# Patient Record
Sex: FEMALE | Race: BLACK OR AFRICAN AMERICAN | NOT HISPANIC OR LATINO | Employment: STUDENT | URBAN - METROPOLITAN AREA
[De-identification: names, ages, dates, MRNs, and addresses within clinical notes are randomized per-mention and may not be internally consistent; named-entity substitution may affect disease eponyms.]

---
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
## 2021-05-12 ENCOUNTER — PATIENT MESSAGE (OUTPATIENT)
Dept: RESEARCH | Facility: HOSPITAL | Age: 21
End: 2021-05-12

## 2023-07-05 ENCOUNTER — PATIENT MESSAGE (OUTPATIENT)
Dept: RESEARCH | Facility: HOSPITAL | Age: 23
End: 2023-07-05

## 2024-02-16 ENCOUNTER — TELEPHONE (OUTPATIENT)
Dept: SURGERY | Facility: CLINIC | Age: 24
End: 2024-02-16
Payer: COMMERCIAL

## 2024-02-22 PROBLEM — Z91.89 AT HIGH RISK FOR BREAST CANCER: Status: ACTIVE | Noted: 2024-02-22

## 2024-02-22 PROBLEM — Z80.3 FAMILY HISTORY OF BREAST CANCER: Status: ACTIVE | Noted: 2024-02-22

## 2024-02-26 NOTE — PROGRESS NOTES
Breast Surgical Oncology  [place]  [facility]        PCP:  Philippe Espinoza Jr., MD  Date of Service: 2024    CHIEF COMPLAINT:   At high-risk for breast cancer    DIAGNOSIS:   Jaclyn Turpin is a 23 y.o. female who is kindly referred by Dr. Vidhya Stoll for evaluation of increased risk of breast cancer based on elevated score by a risk assessment model and family history of breast or ovarian cancer.     Her detailed family history of breast or ovarian cancer is as follows: paternal grandmother with ovarian cancer at age 60,  at age 62; mother with breast cancer at age 49. [employer] genetic testing is negative for a germline mutation.    Today, she denies breast concerns such as pain, masses, skin changes, nipple discharge, nipple retraction or lumps under the arm.     Her breast cancer risk factor profile is as follows: Menarche at 13, Menopause at N/A.  She is . Age at first live birth was N/A. HRT: no    Other breast cancer risk factors include mom with breast CA and family hx of ovarian CA.     FAMILY HISTORY:     Family History   Problem Relation Age of Onset    Diabetes Mother     Hypertension Mother         PAST MEDICAL HISTORY:     Past Medical History:   Diagnosis Date    Alopecia     Asthma     Psoriasis        SURGICAL HISTORY:     Past Surgical History:   Procedure Laterality Date    laparoscopic bilateral salpingectomy  2023    WISDOM TOOTH EXTRACTION         SOCIAL HISTORY:     Social History     Tobacco Use    Smoking status: Never    Smokeless tobacco: Never   Substance Use Topics    Alcohol use: Not Currently    Drug use: Never        MEDICATIONS/ALLERGIES:     Current Outpatient Medications   Medication Instructions    albuterol (PROVENTIL) 2.5 mg /3 mL (0.083 %) nebulizer solution No dose, route, or frequency recorded.    albuterol (PROVENTIL/VENTOLIN HFA) 90 mcg/actuation inhaler INHALE 2 PUFFS INTO THE LUNGS EVERY 6 HOURS AS NEEDED FOR WHEEZING FOR UP TO 30  DAYS.    augmented betamethasone (DIPROLENE) 0.05 % lotion APPLY WEEKLY TO SCALP    cetirizine (ZYRTEC) 10 mg, Oral    clindamycin phosphate foam     clindamycin-benzoyl peroxide gel APPLY SMALL AMOUNT TO ACNE AREAS MONDAY WEDNESDAY AND FRIDAY NIGHTS OR AS TOLERATED    desonide (DESOWEN) 0.05 % cream APPLY TWICE DAILY TO RASHES WHEN FLARING    EPINEPHrine (EPIPEN) 0.3 mg, Injection, Daily PRN    fluconazole (DIFLUCAN) 150 MG Tab Take 1 tablet by mouth on Day 1, Day 4 and Day 7    hydrocortisone 2.5 % cream APPLY TWICE DAILY TO FACIAL RASH FOR 3 DAYS THEN ONLY USE WHEN FLARING    ibuprofen (ADVIL,MOTRIN) 200 mg, Oral    ibuprofen (ADVIL,MOTRIN) 600 mg, Oral, Every 6 hours PRN    methocarbamoL (ROBAXIN) 500 mg, Oral    mupirocin (BACTROBAN) 2 % ointment 2 times daily PRN, Apply to affected area    oxyCODONE (ROXICODONE) 5 mg, Oral, Every 4 hours PRN    tretinoin (RETIN-A) 0.025 % cream PEASIZE AMOUNT TO ACNE AREAS MONDAY WEDNESDAY AND FRIDAY NIGHTS AS TOLERATED     Review of patient's allergies indicates:   Allergen Reactions    Dairycare [lactobacillus acidoph-lactase]     Doxycycline     Gluten protein     Minocycline        REVIEW OF SYSTEMS:   I have reviewed 12 systems, including 2 points per system. Pertinent positives reported are: cold/heat intolerance, frequent urination, easy bruising/bleeding, diarrhea/constipation    PHYSICAL EXAM:   General: The patient appears well and is in no acute distress.     Kathleen Tamez MA was present as a chaperone for the examination.   BREAST EXAM  No Asymmetry  Right:  - Mass: No  - Skin change: No  - Nipple Discharge: No  - Nipple retraction: No  - Axillary LAD: No  Left:   - Mass: No  - Skin change: No  - Nipple Discharge: No  - Nipple retraction: No  - Axillary LAD: No    IMAGING:   Results for orders placed in visit on 01/30/24    US Breast Left Limited    Narrative  Result:  US Breast Left Limited    History:  Patient is 23 y.o. and is seen for breast pain.      Films  Compared:  Prior images (if available) were compared.    Findings:  There is no evidence of suspicious masses.    Limited Breast ultrasound was performed. Ultrasound evaluation demonstrates no suspicious abnormality.    Impression  No sonographic evidence of malignancy.    BI-RADS Category 1: Negative Finding    Recommendation:  Annual mammogram is recommended beginning at age 40.      PATHOLOGY:   none    ASSESSMENT:     1. At high risk for breast cancer    2. Family history of breast cancer          PLAN:   Jaclyn Turpin is a 23 y.o. female who presents for evaluation in the high risk program.  She was identified for the program by her OBGYN due to having a elevated score by a risk assessment model and family history of breast or ovarian cancer.  Her lifetime risk for the development of breast cancer by the Tyrer Cuzick v8 model is 31.8%.  Therefore, she meets criteria to be followed and screened as a high risk patient.      We reviewed the NCCN guidelines for high risk women to be the following:   Twice annual clinical breast exam  Screening mammogram beginning 10 years earlier than the youngest affected family member  Consideration of supplemental annual imaging alternating with her mammogram on the 6 month interval. We discussed that the guidelines currently include breast MRI as the supplemental imaging option.   Adopting risk-reduction strategies and   Consideration of chemoprevention.      Her individualized plan is the following:  She will see me each July for a clinical breast exam and see her OBGYN each January for her second annual clinical breast exam.    She will have her mammogram starting at age 39  She would like to proceed with MRI starting at age 30  Regarding risk reduction, she is recommended to maintain a healthy weight (BMI 18-25), regular aerobic exercise (at least 150 minutes/week), balanced healthy diet (high in vegetables, fruits, and whole grains) and avoidance of red meats, processed foods, &  refined sugars. , and limit alcohol consumption .   We discussed the findings of the NSABP Prevention One trial and the potential side effects of tamoxifen. She is not interested in chemoprevention at this time. .    The patient has had genetic testing and based on the results does not warrant further genetic counseling.     Jaclyn Turpin has a normal breast exam today. We discussed the possible signs and symptoms of breast cancer as lump, masses, new asymmetries, skin changes and nipple changes. She is encouraged to contact me if any new breast concerns arise.  She has been provided a handout that details today's discussion and her plan.     I spent a total of 30 minutes on this visit. This includes face to face time and non-face to face time preparing to see the patient (eg, review of tests), obtaining and/or reviewing separately obtained history, documenting clinical information in the electronic or other health record, independently interpreting results and communicating results to the patient/family/caregiver, or care coordinator.      Pauline Taylor MD

## 2024-03-01 ENCOUNTER — OFFICE VISIT (OUTPATIENT)
Dept: SURGERY | Facility: CLINIC | Age: 24
End: 2024-03-01
Payer: COMMERCIAL

## 2024-03-01 DIAGNOSIS — Z91.89 AT HIGH RISK FOR BREAST CANCER: Primary | ICD-10-CM

## 2024-03-01 DIAGNOSIS — Z80.3 FAMILY HISTORY OF BREAST CANCER: ICD-10-CM

## 2024-03-01 PROCEDURE — 99203 OFFICE O/P NEW LOW 30 MIN: CPT | Mod: S$GLB,,, | Performed by: SURGERY

## 2024-03-01 PROCEDURE — 99999 PR PBB SHADOW E&M-EST. PATIENT-LVL I: CPT | Mod: PBBFAC,,, | Performed by: SURGERY

## 2024-03-27 ENCOUNTER — OFFICE VISIT (OUTPATIENT)
Dept: UROLOGY | Facility: CLINIC | Age: 24
End: 2024-03-27
Payer: COMMERCIAL

## 2024-03-27 VITALS
WEIGHT: 181 LBS | RESPIRATION RATE: 16 BRPM | HEIGHT: 67 IN | SYSTOLIC BLOOD PRESSURE: 117 MMHG | DIASTOLIC BLOOD PRESSURE: 22 MMHG | HEART RATE: 99 BPM | BODY MASS INDEX: 28.41 KG/M2

## 2024-03-27 DIAGNOSIS — K59.00 CONSTIPATION, UNSPECIFIED CONSTIPATION TYPE: ICD-10-CM

## 2024-03-27 DIAGNOSIS — R35.0 URINARY FREQUENCY: Primary | ICD-10-CM

## 2024-03-27 DIAGNOSIS — R35.1 NOCTURIA: ICD-10-CM

## 2024-03-27 LAB
BILIRUB UR QL STRIP: NEGATIVE
GLUCOSE UR QL STRIP: NEGATIVE
KETONES UR QL STRIP: NEGATIVE
LEUKOCYTE ESTERASE UR QL STRIP: NEGATIVE
PH, POC UA: 7
POC BLOOD, URINE: NEGATIVE
POC NITRATES, URINE: NEGATIVE
POC RESIDUAL URINE VOLUME: 0 ML (ref 0–100)
PROT UR QL STRIP: NEGATIVE
SP GR UR STRIP: 1.02 (ref 1–1.03)
UROBILINOGEN UR STRIP-ACNC: 0.2 (ref 0.1–1.1)

## 2024-03-27 PROCEDURE — 3078F DIAST BP <80 MM HG: CPT | Mod: CPTII,S$GLB,, | Performed by: UROLOGY

## 2024-03-27 PROCEDURE — 51798 US URINE CAPACITY MEASURE: CPT | Mod: S$GLB,,, | Performed by: UROLOGY

## 2024-03-27 PROCEDURE — 99999 PR PBB SHADOW E&M-EST. PATIENT-LVL IV: CPT | Mod: PBBFAC,,, | Performed by: UROLOGY

## 2024-03-27 PROCEDURE — 81003 URINALYSIS AUTO W/O SCOPE: CPT | Mod: QW,S$GLB,, | Performed by: UROLOGY

## 2024-03-27 PROCEDURE — 1159F MED LIST DOCD IN RCRD: CPT | Mod: CPTII,S$GLB,, | Performed by: UROLOGY

## 2024-03-27 PROCEDURE — 99204 OFFICE O/P NEW MOD 45 MIN: CPT | Mod: S$GLB,,, | Performed by: UROLOGY

## 2024-03-27 PROCEDURE — 3074F SYST BP LT 130 MM HG: CPT | Mod: CPTII,S$GLB,, | Performed by: UROLOGY

## 2024-03-27 PROCEDURE — 3008F BODY MASS INDEX DOCD: CPT | Mod: CPTII,S$GLB,, | Performed by: UROLOGY

## 2024-03-27 NOTE — PROGRESS NOTES
Subjective:       Patient ID: Jaclyn Turpin is a 23 y.o. female.    Chief Complaint: Urinary Frequency      History of Present Illness:     Ms Turpin says that she underwent a tubal ligation towards the end of 2023 by Dr Stoll.  She says since this time she starting having problems with urinary frequency and nocturia X 1-2.  No significant urgency.  No urinary incontinence.  Occasional feelings of incomplete bladder emptying.  No gross hematuria.  No dysuria.  No history of recurrent UTIs.  She says she is also having problems with constipation and bloating.    Urinary Frequency   Associated symptoms include frequency. Pertinent negatives include no chills, hematuria, nausea, vomiting or rash.        Past Medical History:   Diagnosis Date    Alopecia     Asthma     Psoriasis      Family History   Problem Relation Age of Onset    Breast cancer Mother     Cancer Mother     Diabetes Mother     Hypertension Mother      Social History     Socioeconomic History    Marital status: Single   Tobacco Use    Smoking status: Never    Smokeless tobacco: Never   Substance and Sexual Activity    Alcohol use: Not Currently    Drug use: Never    Sexual activity: Yes   Social History Narrative    ** Merged History Encounter **          Outpatient Encounter Medications as of 3/27/2024   Medication Sig Dispense Refill    albuterol (PROVENTIL) 2.5 mg /3 mL (0.083 %) nebulizer solution       albuterol (PROVENTIL/VENTOLIN HFA) 90 mcg/actuation inhaler INHALE 2 PUFFS INTO THE LUNGS EVERY 6 HOURS AS NEEDED FOR WHEEZING FOR UP TO 30 DAYS.      augmented betamethasone (DIPROLENE) 0.05 % lotion APPLY WEEKLY TO SCALP      cetirizine (ZYRTEC) 10 MG tablet Take 10 mg by mouth.      clindamycin phosphate foam       clindamycin-benzoyl peroxide gel APPLY SMALL AMOUNT TO ACNE AREAS MONDAY WEDNESDAY AND FRIDAY NIGHTS OR AS TOLERATED      desonide (DESOWEN) 0.05 % cream APPLY TWICE DAILY TO RASHES WHEN FLARING      EPINEPHrine (EPIPEN) 0.3 mg/0.3 mL AtIn  "Inject 0.3 mg as directed daily as needed.      hydrocortisone 2.5 % cream APPLY TWICE DAILY TO FACIAL RASH FOR 3 DAYS THEN ONLY USE WHEN FLARING      methocarbamol (ROBAXIN) 500 MG Tab Take 500 mg by mouth.      mupirocin (BACTROBAN) 2 % ointment 2 (two) times daily as needed. Apply to affected area      tretinoin (RETIN-A) 0.025 % cream PEASIZE AMOUNT TO ACNE AREAS MONDAY WEDNESDAY AND FRIDAY NIGHTS AS TOLERATED      fluconazole (DIFLUCAN) 150 MG Tab Take 1 tablet by mouth on Day 1, Day 4 and Day 7 (Patient not taking: Reported on 3/11/2024) 3 tablet 0    ibuprofen (ADVIL,MOTRIN) 200 MG tablet Take 200 mg by mouth.      ibuprofen (ADVIL,MOTRIN) 600 MG tablet Take 1 tablet (600 mg total) by mouth every 6 (six) hours as needed for Pain. (Patient not taking: Reported on 1/5/2024) 30 tablet 1    oxyCODONE (ROXICODONE) 5 MG immediate release tablet Take 1 tablet (5 mg total) by mouth every 4 (four) hours as needed for Pain (pan). (Patient not taking: Reported on 1/5/2024) 12 tablet 0     No facility-administered encounter medications on file as of 3/27/2024.        Review of Systems   Constitutional:  Negative for chills and fever.   Respiratory:  Negative for shortness of breath.    Cardiovascular:  Negative for chest pain.   Gastrointestinal:  Negative for nausea and vomiting.   Genitourinary:  Positive for frequency. Negative for difficulty urinating and hematuria.   Musculoskeletal:  Negative for back pain.   Skin:  Negative for rash.   Neurological:  Negative for dizziness.   Psychiatric/Behavioral:  Negative for agitation.        Objective:     BP (!) 117/22 (BP Location: Right arm, Patient Position: Sitting)   Pulse 99   Resp 16   Ht 5' 7" (1.702 m)   Wt 82.1 kg (181 lb)   LMP 02/21/2023   BMI 28.35 kg/m²     Physical Exam  Constitutional:       General: She is not in acute distress.  Pulmonary:      Effort: Pulmonary effort is normal.   Abdominal:      General: There is no distension.      Palpations: " Abdomen is soft.   Neurological:      Mental Status: She is alert and oriented to person, place, and time.         Office Visit on 03/27/2024   Component Date Value Ref Range Status    POC Residual Urine Volume 03/27/2024 0  0 - 100 mL Final    POC Blood, Urine 03/27/2024 Negative  Negative Final    POC Bilirubin, Urine 03/27/2024 Negative  Negative Final    POC Urobilinogen, Urine 03/27/2024 0.2  0.1 - 1.1 Final    POC Ketones, Urine 03/27/2024 Negative  Negative Final    POC Protein, Urine 03/27/2024 Negative  Negative Final    POC Nitrates, Urine 03/27/2024 Negative  Negative Final    POC Glucose, Urine 03/27/2024 Negative  Negative Final    pH, UA 03/27/2024 7.0   Final    POC Specific Gravity, Urine 03/27/2024 1.020  1.003 - 1.029 Final    POC Leukocytes, Urine 03/27/2024 Negative  Negative Final        Results for orders placed or performed in visit on 03/27/24 (from the past 8760 hour(s))   POCT Bladder Scan   Result Value    POC Residual Urine Volume 0        Assessment:       1. Urinary frequency    2. Nocturia    3. Constipation, unspecified constipation type      Plan:     Orders Placed This Encounter    POCT Bladder Scan    POCT Urinalysis, Dipstick, Automated, W/O Scope        Assessment:  - Urinary frequency and nocturia.  - Constipation.    Plan:  - I discussed the option of her starting on a bladder control medication such as myrbetriq or gemtesa.  She says that she wants to hold off on starting on a bladder control medication at this time.  - I discussed the option of her taking miralax daily for a few weeks to see if this will help with her constipation.  I told her that if her constipation does not improve with the daily miralax, then I would recommend she see a Gastroenterologist.  - I discussed dietary modifications with her today and I recommended she drink mostly water during the day.  - I recommended she limit her fluid intake at night to small amounts of water and to void just prior to  bedtime.    - RTC as needed.

## 2024-08-12 ENCOUNTER — TELEPHONE (OUTPATIENT)
Dept: INTERNAL MEDICINE | Facility: CLINIC | Age: 24
End: 2024-08-12
Payer: COMMERCIAL

## 2024-08-12 NOTE — TELEPHONE ENCOUNTER
----- Message from Brain Sifuentes LPN sent at 8/12/2024  3:24 PM CDT -----  Good afternoon,    Patient would like to see Dr. Salas to est care. Dr. Vidhya Stoll recommended her. Can someone please assist with getting her scheduled?    Thank you!

## 2024-09-17 ENCOUNTER — OFFICE VISIT (OUTPATIENT)
Dept: INTERNAL MEDICINE | Facility: CLINIC | Age: 24
End: 2024-09-17
Payer: COMMERCIAL

## 2024-09-17 ENCOUNTER — HOSPITAL ENCOUNTER (OUTPATIENT)
Dept: RADIOLOGY | Facility: HOSPITAL | Age: 24
Discharge: HOME | End: 2024-09-17
Attending: PHYSICIAN ASSISTANT
Payer: COMMERCIAL

## 2024-09-17 VITALS
TEMPERATURE: 99 F | WEIGHT: 182.56 LBS | OXYGEN SATURATION: 99 % | BODY MASS INDEX: 28.59 KG/M2 | RESPIRATION RATE: 20 BRPM | HEART RATE: 105 BPM | SYSTOLIC BLOOD PRESSURE: 124 MMHG | DIASTOLIC BLOOD PRESSURE: 84 MMHG

## 2024-09-17 DIAGNOSIS — K59.00 CONSTIPATION, UNSPECIFIED CONSTIPATION TYPE: ICD-10-CM

## 2024-09-17 DIAGNOSIS — E53.8 FOLATE DEFICIENCY: ICD-10-CM

## 2024-09-17 DIAGNOSIS — Z00.00 ANNUAL PHYSICAL EXAM: Primary | ICD-10-CM

## 2024-09-17 DIAGNOSIS — E53.8 VITAMIN B12 DEFICIENCY: ICD-10-CM

## 2024-09-17 DIAGNOSIS — D57.3 SICKLE CELL TRAIT: ICD-10-CM

## 2024-09-17 DIAGNOSIS — D50.8 IRON DEFICIENCY ANEMIA SECONDARY TO INADEQUATE DIETARY IRON INTAKE: ICD-10-CM

## 2024-09-17 PROBLEM — E55.9 VITAMIN D DEFICIENCY: Status: ACTIVE | Noted: 2024-09-17

## 2024-09-17 PROCEDURE — 3074F SYST BP LT 130 MM HG: CPT | Mod: CPTII,S$GLB,, | Performed by: PHYSICIAN ASSISTANT

## 2024-09-17 PROCEDURE — 3008F BODY MASS INDEX DOCD: CPT | Mod: CPTII,S$GLB,, | Performed by: PHYSICIAN ASSISTANT

## 2024-09-17 PROCEDURE — 74019 RADEX ABDOMEN 2 VIEWS: CPT | Mod: TC

## 2024-09-17 PROCEDURE — 99385 PREV VISIT NEW AGE 18-39: CPT | Mod: S$GLB,,, | Performed by: PHYSICIAN ASSISTANT

## 2024-09-17 PROCEDURE — 1159F MED LIST DOCD IN RCRD: CPT | Mod: CPTII,S$GLB,, | Performed by: PHYSICIAN ASSISTANT

## 2024-09-17 PROCEDURE — 99999 PR PBB SHADOW E&M-EST. PATIENT-LVL V: CPT | Mod: PBBFAC,,, | Performed by: PHYSICIAN ASSISTANT

## 2024-09-17 PROCEDURE — 3079F DIAST BP 80-89 MM HG: CPT | Mod: CPTII,S$GLB,, | Performed by: PHYSICIAN ASSISTANT

## 2024-09-17 PROCEDURE — 1160F RVW MEDS BY RX/DR IN RCRD: CPT | Mod: CPTII,S$GLB,, | Performed by: PHYSICIAN ASSISTANT

## 2024-09-17 PROCEDURE — 74019 RADEX ABDOMEN 2 VIEWS: CPT | Mod: 26,,, | Performed by: RADIOLOGY

## 2024-09-17 RX ORDER — FLUTICASONE PROPIONATE 50 MCG
1 SPRAY, SUSPENSION (ML) NASAL
COMMUNITY
Start: 2024-08-19

## 2024-09-17 RX ORDER — DOCUSATE SODIUM 100 MG/1
CAPSULE, LIQUID FILLED ORAL
COMMUNITY
End: 2024-09-17

## 2024-09-17 RX ORDER — ERGOCALCIFEROL 1.25 MG/1
1 CAPSULE ORAL
COMMUNITY
Start: 2022-06-23 | End: 2024-09-17

## 2024-09-17 RX ORDER — SULFAMETHOXAZOLE AND TRIMETHOPRIM 800; 160 MG/1; MG/1
1 TABLET ORAL 2 TIMES DAILY PRN
COMMUNITY
Start: 2024-08-03 | End: 2024-09-17

## 2024-09-17 RX ORDER — LEVOCETIRIZINE DIHYDROCHLORIDE 5 MG/1
5 TABLET, FILM COATED ORAL 2 TIMES DAILY
COMMUNITY

## 2024-09-17 RX ORDER — FOLIC ACID 0.8 MG
800 TABLET ORAL DAILY
COMMUNITY

## 2024-09-17 RX ORDER — METHOCARBAMOL 750 MG/1
750 TABLET, FILM COATED ORAL
COMMUNITY

## 2024-09-17 RX ORDER — PIMECROLIMUS 10 MG/G
CREAM TOPICAL
COMMUNITY
End: 2024-09-17

## 2024-09-17 RX ORDER — SYRING-NEEDL,DISP,INSUL,0.3 ML 29 G X1/2"
SYRINGE, EMPTY DISPOSABLE MISCELLANEOUS 2 TIMES DAILY PRN
COMMUNITY

## 2024-09-17 RX ORDER — CLASCOTERONE 1 G/100G
CREAM TOPICAL
COMMUNITY
Start: 2024-08-12 | End: 2024-09-17

## 2024-09-17 NOTE — PROGRESS NOTES
Subjective:      Patient ID: Jaclyn Turpin is a 24 y.o. female.    Chief Complaint: Establish Care (She is here to est care with a new PCP, states not happy with current PCP. )    Patient is new to clinic, being seen today to establish care at this location.     Intermittent low back pain x2mths, currently taking robaxin prn    She is pescatarian    Previous PCP Dr. Philippe Espinoza, last check up ~2mths ago     Review of Systems   Constitutional:  Negative for chills, diaphoresis and fever.   HENT:  Negative for congestion, rhinorrhea and sore throat.    Respiratory:  Negative for cough, shortness of breath and wheezing.         H/o asthma, albuterol prn   Gastrointestinal:  Positive for abdominal pain and constipation (2mths intermittent). Negative for blood in stool, diarrhea, nausea and vomiting.        Taking miralax daily, mg citrate prn   Musculoskeletal:  Positive for arthralgias (L foot x1-2wks, denies injury trauma) and back pain (lower, does not radiate to legs, denies numbness/tingling).   Skin:  Negative for rash.   Neurological:  Negative for dizziness, light-headedness, numbness and headaches.       Objective:   /84 (BP Location: Right arm, Patient Position: Sitting, BP Method: Medium (Manual))   Pulse 105   Temp 98.6 °F (37 °C) (Tympanic)   Resp 20   Wt 82.8 kg (182 lb 8.7 oz)   LMP 09/14/2024   SpO2 99%   BMI 28.59 kg/m²   Physical Exam  Constitutional:       General: She is not in acute distress.     Appearance: Normal appearance. She is well-developed. She is not ill-appearing.   HENT:      Head: Normocephalic and atraumatic.   Cardiovascular:      Rate and Rhythm: Normal rate and regular rhythm.      Heart sounds: Normal heart sounds. No murmur heard.  Pulmonary:      Effort: Pulmonary effort is normal. No respiratory distress.      Breath sounds: Normal breath sounds. No decreased breath sounds.   Abdominal:      General: Bowel sounds are normal.      Palpations: Abdomen is soft.       Tenderness: There is abdominal tenderness in the left upper quadrant and left lower quadrant.   Musculoskeletal:      Cervical back: Bony tenderness present. No tenderness.      Right lower leg: No edema.      Left lower leg: No edema.        Feet:    Skin:     General: Skin is warm and dry.      Findings: No rash.   Psychiatric:         Speech: Speech normal.         Behavior: Behavior normal.         Thought Content: Thought content normal.       Assessment:      1. Annual physical exam    2. Vitamin B12 deficiency    3. Iron deficiency anemia secondary to inadequate dietary iron intake    4. Folate deficiency    5. Constipation, unspecified constipation type    6. Sickle cell trait       Plan:   Annual physical exam  -     Ambulatory referral/consult to Internal Medicine  -     CBC Auto Differential; Future; Expected date: 09/17/2024  -     Comprehensive Metabolic Panel; Future; Expected date: 09/17/2024  -     Hemoglobin A1C; Future; Expected date: 09/17/2024  -     Lipid Panel; Future; Expected date: 09/17/2024  -     TSH; Future; Expected date: 09/17/2024    Vitamin B12 deficiency  -     Vitamin B12; Future; Expected date: 09/17/2024    Iron deficiency anemia secondary to inadequate dietary iron intake  -     Iron and TIBC; Future; Expected date: 09/17/2024  -     Ferritin; Future; Expected date: 09/17/2024  -     Ambulatory referral/consult to Hematology / Oncology; Future; Expected date: 09/24/2024    Folate deficiency  -     FOLATE; Future; Expected date: 09/17/2024  -     Ambulatory referral/consult to Hematology / Oncology; Future; Expected date: 09/24/2024    Constipation, unspecified constipation type  -     Ambulatory referral/consult to Gastroenterology; Future; Expected date: 09/24/2024  -     X-Ray Abdomen Flat And Erect; Future; Expected date: 09/17/2024    Sickle cell trait  -     Ambulatory referral/consult to Hematology / Oncology; Future; Expected date: 09/24/2024      Consider x-ray foot if  pain persists >1wk     DONATO most recent labs and visit note from PCP     Start daily stool softener/probiotic, ensure adequate hydration and fiber intake     Fasting labs     F/u PCP to establish care

## 2024-09-25 ENCOUNTER — TELEPHONE (OUTPATIENT)
Dept: HEMATOLOGY/ONCOLOGY | Facility: CLINIC | Age: 24
End: 2024-09-25
Payer: COMMERCIAL

## 2024-09-25 NOTE — TELEPHONE ENCOUNTER
Left message in reference to Hematology referral from Mary Claire Hufft, PA-C. [facility] message sent.

## 2024-10-08 ENCOUNTER — PATIENT MESSAGE (OUTPATIENT)
Dept: INTERNAL MEDICINE | Facility: CLINIC | Age: 24
End: 2024-10-08
Payer: COMMERCIAL

## 2024-10-25 ENCOUNTER — TELEPHONE (OUTPATIENT)
Dept: DERMATOLOGY | Facility: CLINIC | Age: 24
End: 2024-10-25
Payer: COMMERCIAL

## 2024-10-25 NOTE — TELEPHONE ENCOUNTER
Returned call. Pt scheduled to see dhruv on Monday!   ----- Message from Dariel Ferrer sent at 10/25/2024 12:56 PM CDT -----  Regarding: sooner appt request  PATIENT CALL    Pt called, would like to speak w/ a nurse about getting a sooner appt than 12/24. CC: bumps on hairline, condition worsening. She went to urgent care who couldn't diagnose the cause and would like to be seen seen ASAP bc the itching is getting worse and the bumps are spreading to her forehead area. Please call back at [phone]

## 2024-10-28 ENCOUNTER — PATIENT MESSAGE (OUTPATIENT)
Dept: DERMATOLOGY | Facility: CLINIC | Age: 24
End: 2024-10-28
Payer: COMMERCIAL

## 2024-10-28 ENCOUNTER — TELEPHONE (OUTPATIENT)
Dept: DERMATOLOGY | Facility: CLINIC | Age: 24
End: 2024-10-28
Payer: COMMERCIAL

## 2024-11-07 ENCOUNTER — TELEPHONE (OUTPATIENT)
Dept: HEMATOLOGY/ONCOLOGY | Facility: CLINIC | Age: 24
End: 2024-11-07
Payer: COMMERCIAL

## 2024-11-07 DIAGNOSIS — D50.8 IRON DEFICIENCY ANEMIA SECONDARY TO INADEQUATE DIETARY IRON INTAKE: Primary | ICD-10-CM

## 2024-11-07 NOTE — TELEPHONE ENCOUNTER
----- Message from Anthony Byrne sent at 11/5/2024  4:34 PM CST -----  Contact: pt    ----- Message -----  From: Anjali Cerrato  Sent: 11/5/2024   1:09 PM CST  To: [facility] Hematology/Oncology Scheduling Pool    Type:  Sooner Apoointment Request    Caller is requesting a sooner appointment.  Caller declined first available appointment listed below.  Caller will not accept being placed on the waitlist and is requesting a message be sent to doctor.  Name of Caller: pt  When is the first available appointment?   Symptoms: referral in pt's chart  Would the patient rather a call back or a response via [facility]?  phone  Best Call Back Number: [phone]  Additional Information:  [facility]

## 2024-11-08 ENCOUNTER — OFFICE VISIT (OUTPATIENT)
Dept: DERMATOLOGY | Facility: CLINIC | Age: 24
End: 2024-11-08
Payer: COMMERCIAL

## 2024-11-08 ENCOUNTER — LAB VISIT (OUTPATIENT)
Dept: LAB | Facility: HOSPITAL | Age: 24
End: 2024-11-08
Attending: NURSE PRACTITIONER
Payer: COMMERCIAL

## 2024-11-08 DIAGNOSIS — L30.9 DERMATITIS: Primary | ICD-10-CM

## 2024-11-08 DIAGNOSIS — D50.8 IRON DEFICIENCY ANEMIA SECONDARY TO INADEQUATE DIETARY IRON INTAKE: ICD-10-CM

## 2024-11-08 LAB
BASOPHILS # BLD AUTO: 0.04 K/UL (ref 0–0.2)
BASOPHILS NFR BLD: 0.9 % (ref 0–1.9)
DIFFERENTIAL METHOD BLD: ABNORMAL
EOSINOPHIL # BLD AUTO: 0 K/UL (ref 0–0.5)
EOSINOPHIL NFR BLD: 0.2 % (ref 0–8)
ERYTHROCYTE [DISTWIDTH] IN BLOOD BY AUTOMATED COUNT: 14.3 % (ref 11.5–14.5)
FERRITIN SERPL-MCNC: 14 NG/ML (ref 20–300)
HCT VFR BLD AUTO: 33 % (ref 37–48.5)
HGB BLD-MCNC: 11.5 G/DL (ref 12–16)
IMM GRANULOCYTES # BLD AUTO: 0.01 K/UL (ref 0–0.04)
IMM GRANULOCYTES NFR BLD AUTO: 0.2 % (ref 0–0.5)
IRON SERPL-MCNC: 26 UG/DL (ref 30–160)
LYMPHOCYTES # BLD AUTO: 1.4 K/UL (ref 1–4.8)
LYMPHOCYTES NFR BLD: 30.3 % (ref 18–48)
MCH RBC QN AUTO: 29.9 PG (ref 27–31)
MCHC RBC AUTO-ENTMCNC: 34.8 G/DL (ref 32–36)
MCV RBC AUTO: 86 FL (ref 82–98)
MONOCYTES # BLD AUTO: 0.5 K/UL (ref 0.3–1)
MONOCYTES NFR BLD: 10 % (ref 4–15)
NEUTROPHILS # BLD AUTO: 2.7 K/UL (ref 1.8–7.7)
NEUTROPHILS NFR BLD: 58.4 % (ref 38–73)
NRBC BLD-RTO: 0 /100 WBC
PLATELET # BLD AUTO: 468 K/UL (ref 150–450)
PMV BLD AUTO: 8.9 FL (ref 9.2–12.9)
RBC # BLD AUTO: 3.85 M/UL (ref 4–5.4)
SATURATED IRON: 6 % (ref 20–50)
TOTAL IRON BINDING CAPACITY: 423 UG/DL (ref 250–450)
TRANSFERRIN SERPL-MCNC: 286 MG/DL (ref 200–375)
WBC # BLD AUTO: 4.68 K/UL (ref 3.9–12.7)

## 2024-11-08 PROCEDURE — 85025 COMPLETE CBC W/AUTO DIFF WBC: CPT | Performed by: NURSE PRACTITIONER

## 2024-11-08 PROCEDURE — 82728 ASSAY OF FERRITIN: CPT | Performed by: NURSE PRACTITIONER

## 2024-11-08 PROCEDURE — 83540 ASSAY OF IRON: CPT | Performed by: NURSE PRACTITIONER

## 2024-11-08 PROCEDURE — 36415 COLL VENOUS BLD VENIPUNCTURE: CPT | Performed by: NURSE PRACTITIONER

## 2024-11-08 PROCEDURE — 99999 PR PBB SHADOW E&M-EST. PATIENT-LVL III: CPT | Mod: PBBFAC,,, | Performed by: STUDENT IN AN ORGANIZED HEALTH CARE EDUCATION/TRAINING PROGRAM

## 2024-11-08 RX ORDER — CRANBERRY FRUIT 450 MG
TABLET ORAL
COMMUNITY

## 2024-11-08 RX ORDER — FENUGREEK SEED 610 MG
CAPSULE ORAL
COMMUNITY

## 2024-11-08 RX ORDER — CLINDAMYCIN PHOSPHATE 10 MG/ML
SOLUTION TOPICAL DAILY
Qty: 60 EACH | Refills: 2 | Status: SHIPPED | OUTPATIENT
Start: 2024-11-08 | End: 2025-11-08

## 2024-11-08 RX ORDER — DESONIDE 0.5 MG/G
CREAM TOPICAL DAILY
Qty: 60 G | Refills: 1 | Status: SHIPPED | OUTPATIENT
Start: 2024-11-08

## 2024-11-08 RX ORDER — FLUCONAZOLE 200 MG/1
200 TABLET ORAL WEEKLY
Qty: 4 TABLET | Refills: 0 | Status: SHIPPED | OUTPATIENT
Start: 2024-11-08 | End: 2024-12-08

## 2024-11-08 RX ORDER — TRETINOIN 0.25 MG/G
CREAM TOPICAL EVERY OTHER DAY
Qty: 45 G | Refills: 2 | Status: SHIPPED | OUTPATIENT
Start: 2024-11-08

## 2024-11-08 NOTE — PROGRESS NOTES
Subjective:       Patient ID:  Jaclyn Turpin is a 24 y.o. female who presents for   Chief Complaint   Patient presents with    Rash     C/o rash on face that itchy, red, and inflamed.      History of Present Illness: The patient presents with chief complaint of persistent and recurring rash/bumps. Patient repots for years having breakouts, pus bump lesions leading to dark marks, itching, and irritation on the forehead, especially around the hairline, and sometimes elsewhere on the face. Has been given salicylic acid shampoo and other topical oils to help with minimal improvement in symptoms.     Rash        Review of Systems   Constitutional:  Negative for fever and chills.   Skin:  Positive for rash.        Objective:    Physical Exam   Constitutional: She appears well-developed and well-nourished. No distress.   Neurological: She is alert and oriented to person, place, and time. She is not disoriented.   Psychiatric: She has a normal mood and affect.   Skin:   Areas Examined (abnormalities noted in diagram):   Scalp / Hair Palpated and Inspected  Head / Face Inspection Performed  Neck Inspection Performed  RUE Inspected  LUE Inspection Performed              Diagram Legend     Erythematous scaling macule/papule c/w actinic keratosis       Vascular papule c/w angioma      Pigmented verrucoid papule/plaque c/w seborrheic keratosis      Yellow umbilicated papule c/w sebaceous hyperplasia      Irregularly shaped tan macule c/w lentigo     1-2 mm smooth white papules consistent with Milia      Movable subcutaneous cyst with punctum c/w epidermal inclusion cyst      Subcutaneous movable cyst c/w pilar cyst      Firm pink to brown papule c/w dermatofibroma      Pedunculated fleshy papule(s) c/w skin tag(s)      Evenly pigmented macule c/w junctional nevus     Mildly variegated pigmented, slightly irregular-bordered macule c/w mildly atypical nevus      Flesh colored to evenly pigmented papule c/w intradermal nevus        Pink pearly papule/plaque c/w basal cell carcinoma      Erythematous hyperkeratotic cursted plaque c/w SCC      Surgical scar with no sign of skin cancer recurrence      Open and closed comedones      Inflammatory papules and pustules      Verrucoid papule consistent consistent with wart     Erythematous eczematous patches and plaques     Dystrophic onycholytic nail with subungual debris c/w onychomycosis     Umbilicated papule    Erythematous-base heme-crusted tan verrucoid plaque consistent with inflamed seborrheic keratosis     Erythematous Silvery Scaling Plaque c/w Psoriasis     See annotation      Assessment / Plan:        Dermatitis - suspect folliculitis. Will treat with medications listed below.   -     clindamycin (CLEOCIN T) 1 % Swab; Apply topically once daily.  Dispense: 60 each; Refill: 2  -     desonide (DESOWEN) 0.05 % cream; Apply topically once daily.  Dispense: 60 g; Refill: 1  -     fluconazole (DIFLUCAN) 200 MG Tab; Take 1 tablet (200 mg total) by mouth once a week.  Dispense: 4 tablet; Refill: 0  -     tretinoin (RETIN-A) 0.025 % cream; Apply topically every other day.  Dispense: 45 g; Refill: 2             Follow up in about 3 months (around 2/8/2025).

## 2024-11-15 ENCOUNTER — PATIENT MESSAGE (OUTPATIENT)
Dept: HEMATOLOGY/ONCOLOGY | Facility: CLINIC | Age: 24
End: 2024-11-15

## 2024-11-15 ENCOUNTER — OFFICE VISIT (OUTPATIENT)
Dept: HEMATOLOGY/ONCOLOGY | Facility: CLINIC | Age: 24
End: 2024-11-15
Payer: COMMERCIAL

## 2024-11-15 VITALS
HEART RATE: 96 BPM | BODY MASS INDEX: 28.07 KG/M2 | DIASTOLIC BLOOD PRESSURE: 95 MMHG | HEIGHT: 67 IN | WEIGHT: 178.81 LBS | SYSTOLIC BLOOD PRESSURE: 127 MMHG

## 2024-11-15 DIAGNOSIS — J45.909 ASTHMA, UNSPECIFIED ASTHMA SEVERITY, UNSPECIFIED WHETHER COMPLICATED, UNSPECIFIED WHETHER PERSISTENT: Primary | ICD-10-CM

## 2024-11-15 DIAGNOSIS — D50.8 IRON DEFICIENCY ANEMIA SECONDARY TO INADEQUATE DIETARY IRON INTAKE: ICD-10-CM

## 2024-11-15 DIAGNOSIS — E53.8 FOLATE DEFICIENCY: ICD-10-CM

## 2024-11-15 DIAGNOSIS — D57.3 SICKLE CELL TRAIT: ICD-10-CM

## 2024-11-15 PROCEDURE — 99999 PR PBB SHADOW E&M-EST. PATIENT-LVL IV: CPT | Mod: PBBFAC,,, | Performed by: NURSE PRACTITIONER

## 2024-11-15 RX ORDER — METHYLPREDNISOLONE SOD SUCC 125 MG
80 VIAL (EA) INJECTION
Start: 2024-11-15

## 2024-11-15 RX ORDER — DIPHENHYDRAMINE HYDROCHLORIDE 50 MG/ML
50 INJECTION INTRAMUSCULAR; INTRAVENOUS ONCE AS NEEDED
OUTPATIENT
Start: 2024-11-15

## 2024-11-15 RX ORDER — SODIUM CHLORIDE 0.9 % (FLUSH) 0.9 %
10 SYRINGE (ML) INJECTION
OUTPATIENT
Start: 2024-11-15

## 2024-11-15 RX ORDER — EPINEPHRINE 0.3 MG/.3ML
0.3 INJECTION SUBCUTANEOUS ONCE AS NEEDED
OUTPATIENT
Start: 2024-11-15

## 2024-11-15 NOTE — PROGRESS NOTES
NEW PATIENT INITIAL VISIT HEMATOLOGY CLINIC      Name: Jaclyn Turpin   MRN: [id number]   : 2000     PMH:   Past Medical History:   Diagnosis Date    Alopecia     Asthma     Eating disorder     Managed currently    Psoriasis     Sickle cell trait      Chief complaint: anemia referral     HPI: Jaclyn is a 24 y.o. female with sickle cell trait, asthma, and h/o eating disorder referred to hematology clinic for evaluation of mild anemia found on labs done 10/2024.     Jaclyn reports that she has been anemic before in 10/2017 after blood donation. Her hemoglobin at this time was found to be 5.5gm/dL, she received IV iron infusions (Venofer x 4), no PRBC transfusion. She also reports that this is when she found that she has sickle cell trait. Jaclyn endorses symptoms of tingling and pain from her sickle cell trait, but this has not happened in a while.    She states that her only symptom right now is shortness of breath, but attributes this to second hand smoke from her new apartment building (her neighbors smoke constantly). She is using her Albuterol nightly and nebulizer 1x/week to every month. She does not follow with a pulmonologist for her asthma. She has acne for which she sees dermatology. Recently had constipation problems, but started using prune juice daily which is helping. No bleeding from rectum, dark stool, or hemorrhoids reported. She does not use NSAIDs often (uses more Tylenol for pain if she has it). She has never had an EGD/colonoscopy. She does have an appt with GI for her constipation in December. Pt also reports previous syncope and lightheadedness with standing that has occurred since she was a teenager.     Otherwise denies fevers, lymphadenopathy, unintentional weight loss, night sweats, chills, headaches, or easy bruising/bleeding.     Past surgical Hx includes wisdom teeth removal and b/l salpingectomy 2024 (elective procedure, desired sterility).     She reports a gluten sensitivity for which she eats  a gluten free diet. She also reports sensitivity to dairy and proteins other than fish. Previously she was on folate, vitamin D, vitamin B12, and vitamin E, but stopped most of these medications because of skin care concerns. Her current OTC regimen includes cranberry extract, fenugreek (for skin care), and chelated magnesium (for muscle cramps).     CKD - no  Diabetes - no  HTN - no     Denies drug and cigarette use. Alcohol use - socially once a week ~3 drinks. Patient lives on her own in an apartment. Recently graduated from college in May 2024 and looking for a job.     Cycle history - LMP: 11/6/24, 3-4 days; 1-2 pads per day, no clots; regular every month    FH: mother breast cancer (2011 - age of 48); paternal grandmother ovarian cancer        There were no vitals filed for this visit.     Review of patient's allergies indicates:   Allergen Reactions    Dairycare [lactobacillus acidoph-lactase]     Doxycycline     Gluten protein     [place] Bridge Hives and Itching     Itchy throat and hives    Minocycline     Strawberry Hives and Itching     Itching throat and mouth and hives                           Review of Systems   Constitutional:  Negative for fatigue and fever.   HENT:  Negative for congestion, mouth sores and nosebleeds.    Eyes: Negative.    Respiratory:  Positive for shortness of breath. Negative for cough and chest tightness.    Gastrointestinal:  Positive for constipation and nausea. Negative for blood in stool, diarrhea and vomiting.   Genitourinary:  Negative for hematuria.   Musculoskeletal: Negative.    Skin:         Sees dermatology for acne    Neurological:  Positive for dizziness (if she gets up too fast), syncope (no LOC) and light-headedness. Negative for tremors, seizures, weakness and headaches.   Hematological:  Does not bruise/bleed easily.       Physical Exam  Constitutional:       General: She is not in acute distress.     Appearance: Normal appearance.   HENT:      Head: Normocephalic and  atraumatic.      Nose: Nose normal. No congestion.      Mouth/Throat:      Mouth: Mucous membranes are moist.   Eyes:      General: No scleral icterus.     Pupils: Pupils are equal, round, and reactive to light.   Cardiovascular:      Rate and Rhythm: Normal rate and regular rhythm.      Pulses: Normal pulses.      Heart sounds: No murmur heard.  Pulmonary:      Effort: Pulmonary effort is normal. No respiratory distress.   Abdominal:      General: Bowel sounds are normal. There is no distension.      Palpations: Abdomen is soft.      Tenderness: There is no abdominal tenderness.   Musculoskeletal:         General: No swelling or tenderness.   Skin:     Capillary Refill: Capillary refill takes less than 2 seconds.      Coloration: Skin is not jaundiced or pale.      Findings: No bruising or rash.   Neurological:      General: No focal deficit present.      Mental Status: She is alert and oriented to person, place, and time.   Psychiatric:         Mood and Affect: Mood normal.       Lab Results   Component Value Date    WBC 4.68 11/08/2024    RBC 3.85 (L) 11/08/2024    HGB 11.5 (L) 11/08/2024    HCT 33.0 (L) 11/08/2024    MCV 86 11/08/2024    MCH 29.9 11/08/2024    MCHC 34.8 11/08/2024    RDW 14.3 11/08/2024     (H) 11/08/2024    MPV 8.9 (L) 11/08/2024    GRAN 2.7 11/08/2024    GRAN 58.4 11/08/2024    LYMPH 1.4 11/08/2024    LYMPH 30.3 11/08/2024    MONO 0.5 11/08/2024    MONO 10.0 11/08/2024    EOS 0.0 11/08/2024    BASO 0.04 11/08/2024    EOSINOPHIL 0.2 11/08/2024    BASOPHIL 0.9 11/08/2024      Lab Results   Component Value Date    IRON 26 (L) 11/08/2024    TRANSFERRIN 286 11/08/2024    TIBC 423 11/08/2024    FESATURATED 6 (L) 11/08/2024      Lab Results   Component Value Date    FERRITIN 14 (L) 11/08/2024               ASSESSMENT/RECOMMENDATIONS:     1. Iron deficiency anemia secondary to inadequate dietary iron intake    2. Folate deficiency    3. Sickle cell trait        Med Onc Chart Routing      Follow  up with physician    Follow up with MAKSIM . Follow up 6 weeks after last dose of Ferreheme with labs prior; in person   Infusion scheduling note   Ferreheme x 2 at [facility]   Injection scheduling note    Labs   Scheduling:  Preferred lab: [facility]  Lab interval:  CBC, CMP, iron and TIBC, ferritin, folate, vitamin B12   Imaging    Pharmacy appointment No pharmacy appointment needed      Other referrals       No additional referrals needed             Patient with iron deficiency anemia likely nutritional. Since she is already having constipation, will replete with IV iron. Feraheme x2 ordered today. Will see her back with labs 6 weeks after last dose of feraheme. Since she has asthma will pre-medicate with steroids. GI appt in December. Previously on folate and B12 until 1 month ago, continue supplementation at this time due to nutritional status and we can check these labs at next appt. Consider EGD in the future if she continues to have anemia. Referral to pulmonology placed for asthma since she is using her albuterol almost nightly.     Total time spent on encounter: 60 minutes    Collaborating Provider:  Dr. Aman Godinez    Thank You,  DEENA Richard  Benign Hematology     no

## 2024-11-26 ENCOUNTER — PATIENT MESSAGE (OUTPATIENT)
Dept: GASTROENTEROLOGY | Facility: CLINIC | Age: 24
End: 2024-11-26
Payer: COMMERCIAL

## 2024-12-02 ENCOUNTER — TELEPHONE (OUTPATIENT)
Dept: INFUSION THERAPY | Facility: HOSPITAL | Age: 24
End: 2024-12-02
Payer: COMMERCIAL

## 2024-12-05 ENCOUNTER — INFUSION (OUTPATIENT)
Dept: INFUSION THERAPY | Facility: HOSPITAL | Age: 24
End: 2024-12-05
Attending: INTERNAL MEDICINE
Payer: COMMERCIAL

## 2024-12-05 ENCOUNTER — PATIENT MESSAGE (OUTPATIENT)
Dept: INFUSION THERAPY | Facility: HOSPITAL | Age: 24
End: 2024-12-05

## 2024-12-05 VITALS
RESPIRATION RATE: 16 BRPM | OXYGEN SATURATION: 100 % | HEART RATE: 78 BPM | TEMPERATURE: 98 F | DIASTOLIC BLOOD PRESSURE: 76 MMHG | SYSTOLIC BLOOD PRESSURE: 116 MMHG

## 2024-12-05 DIAGNOSIS — J45.909 ASTHMA, UNSPECIFIED ASTHMA SEVERITY, UNSPECIFIED WHETHER COMPLICATED, UNSPECIFIED WHETHER PERSISTENT: ICD-10-CM

## 2024-12-05 DIAGNOSIS — D50.8 IRON DEFICIENCY ANEMIA SECONDARY TO INADEQUATE DIETARY IRON INTAKE: Primary | ICD-10-CM

## 2024-12-05 PROCEDURE — 63600175 PHARM REV CODE 636 W HCPCS: Performed by: PHYSICIAN ASSISTANT

## 2024-12-05 PROCEDURE — 25000003 PHARM REV CODE 250: Performed by: PHYSICIAN ASSISTANT

## 2024-12-05 PROCEDURE — 96375 TX/PRO/DX INJ NEW DRUG ADDON: CPT

## 2024-12-05 PROCEDURE — 96374 THER/PROPH/DIAG INJ IV PUSH: CPT

## 2024-12-05 RX ORDER — EPINEPHRINE 0.3 MG/.3ML
0.3 INJECTION SUBCUTANEOUS ONCE AS NEEDED
Status: DISCONTINUED | OUTPATIENT
Start: 2024-12-05 | End: 2024-12-05 | Stop reason: HOSPADM

## 2024-12-05 RX ORDER — METHYLPREDNISOLONE SOD SUCC 125 MG
80 VIAL (EA) INJECTION
Status: COMPLETED | OUTPATIENT
Start: 2024-12-05 | End: 2024-12-05

## 2024-12-05 RX ORDER — DIPHENHYDRAMINE HYDROCHLORIDE 50 MG/ML
50 INJECTION INTRAMUSCULAR; INTRAVENOUS ONCE AS NEEDED
OUTPATIENT
Start: 2024-12-05

## 2024-12-05 RX ORDER — DIPHENHYDRAMINE HYDROCHLORIDE 50 MG/ML
50 INJECTION INTRAMUSCULAR; INTRAVENOUS ONCE AS NEEDED
Status: DISCONTINUED | OUTPATIENT
Start: 2024-12-05 | End: 2024-12-05 | Stop reason: HOSPADM

## 2024-12-05 RX ORDER — METHYLPREDNISOLONE SOD SUCC 125 MG
80 VIAL (EA) INJECTION
Start: 2024-12-05

## 2024-12-05 RX ORDER — SODIUM CHLORIDE 0.9 % (FLUSH) 0.9 %
10 SYRINGE (ML) INJECTION
Status: DISCONTINUED | OUTPATIENT
Start: 2024-12-05 | End: 2024-12-05 | Stop reason: HOSPADM

## 2024-12-05 RX ORDER — SODIUM CHLORIDE 0.9 % (FLUSH) 0.9 %
10 SYRINGE (ML) INJECTION
OUTPATIENT
Start: 2024-12-05

## 2024-12-05 RX ORDER — EPINEPHRINE 0.3 MG/.3ML
0.3 INJECTION SUBCUTANEOUS ONCE AS NEEDED
OUTPATIENT
Start: 2024-12-05

## 2024-12-05 RX ADMIN — FERUMOXYTOL 510 MG: 510 INJECTION INTRAVENOUS at 03:12

## 2024-12-05 RX ADMIN — METHYLPREDNISOLONE SODIUM SUCCINATE 80 MG: 125 INJECTION, POWDER, FOR SOLUTION INTRAMUSCULAR; INTRAVENOUS at 03:12

## 2024-12-05 NOTE — DISCHARGE INSTRUCTIONS
.[facility]  [address] [address]  [address]  [phone] phone     [phone] fax  Hours of Operation: Monday- Friday 8:00am- 5:00pm  After hours phone  [phone]  Hematology / Oncology Physicians on call    Dr. Herbert Gutierrez           Nurse Practitioners:     Luann Salter, PHIL Maldonado, ENZO Trevizo, PHIL Schneider, PHIL Arzate, NP    Please don't hesitate to call if you have any concerns.      FALL PREVENTION   Falls often occur due to slipping, tripping or losing your balance. Here are ways to reduce your risk of falling again.   Was there anything that caused your fall that can be fixed, removed or replaced?   Make your home safe by keeping walkways clear of objects you may trip over.   Use non-slip pads under rugs.   Do not walk in poorly lit areas.   Do not stand on chairs or wobbly ladders.   Use caution when reaching overhead or looking upward. This position can cause a loss of balance.   Be sure your shoes fit properly, have non-slip bottoms and are in good condition.   Be cautious when going up and down stairs, curbs, and when walking on uneven sidewalks.   If your balance is poor, consider using a cane or walker.   If your fall was related to alcohol use, stop or limit alcohol intake.   If your fall was related to use of sleeping medicines, talk to your doctor about this. You may need to reduce your dosage at bedtime if you awaken during the night to go to the bathroom.   To reduce the need for nighttime bathroom trips:   Avoid drinking fluids for several hours before going to bed   Empty your bladder before going to bed   Men can keep a urinal at the bedside   © 2759-7152 [employer], [address] [place] [address], [place] [address]. All rights reserved. This information is not intended as a substitute for professional medical care. Always follow your healthcare  professional's instructions.    WAYS TO HELP PREVENT INFECTION        WASH YOUR HANDS OFTEN DURING THE DAY, ESPECIALLY BEFORE YOU EAT, AFTER USING THE BATHROOM, AND AFTER TOUCHING ANIMALS    STAY AWAY FROM PEOPLE WHO HAVE ILLNESSES YOU CAN CATCH; SUCH AS COLDS, FLU, CHICKEN POX    TRY TO AVOID CROWDS    STAY AWAY FROM CHILDREN WHO RECENTLY HAVE RECEIVED LIVE VIRUS VACCINES    MAINTAIN GOOD MOUTH CARE    DO NOT SQUEEZE OR SCRATCH PIMPLES    CLEAN CUTS & SCRAPES RIGHT AWAY AND DAILY UNTIL HEALED WITH WARM WATER, SOAP & AN ANTISEPTIC    AVOID CONTACT WITH LITTER BOXES, BIRD CAGES, & FISH TANKS    AVOID STANDING WATER, IE., BIRD BATHS, FLOWER POTS/VASES, OR HUMIDIFIERS    WEAR GLOVES WHEN GARDENING OR CLEANING UP AFTER OTHERS, ESPECIALLY BABIES & SMALL CHILDREN    DO NOT EAT RAW FISH, SEAFOOD, MEAT, OR EGGS

## 2024-12-05 NOTE — PLAN OF CARE
Discussed POC with patient, answered any questions. Instructed patient to notify nurse with any changes since starting the infusion. Patient stated she understood.    Problem: Adult Inpatient Plan of Care  Goal: Plan of Care Review  Outcome: Progressing  Flowsheets (Taken 12/5/2024 1537)  Plan of Care Reviewed With: patient  Goal: Patient-Specific Goal (Individualized)  Outcome: Progressing  Flowsheets (Taken 12/5/2024 1537)  Individualized Care Needs: Reclined, pillow under arm with IV  Anxieties, Fears or Concerns: No concerns expressed. States she had iron infusions a few years ago in [place]  Patient/Family-Specific Goals (Include Timeframe): To tolerate first iron infusion today  Goal: Optimal Comfort and Wellbeing  Outcome: Progressing  Intervention: Monitor Pain and Promote Comfort  Flowsheets (Taken 12/5/2024 1537)  Pain Management Interventions:   quiet environment facilitated   pillow support provided  Intervention: Provide Person-Centered Care  Flowsheets (Taken 12/5/2024 1537)  Trust Relationship/Rapport:   care explained   reassurance provided   choices provided   thoughts/feelings acknowledged   emotional support provided   empathic listening provided   questions answered   questions encouraged     Problem: Anemia  Goal: Anemia Symptom Improvement  Outcome: Progressing  Intervention: Monitor and Manage Anemia  Flowsheets (Taken 12/5/2024 1537)  Safety Promotion/Fall Prevention:   in recliner, wheels locked   instructed to call staff for mobility   lighting adjusted   medications reviewed  Fatigue Management: frequent rest breaks encouraged

## 2024-12-12 ENCOUNTER — INFUSION (OUTPATIENT)
Dept: INFUSION THERAPY | Facility: HOSPITAL | Age: 24
End: 2024-12-12
Attending: PHYSICIAN ASSISTANT
Payer: COMMERCIAL

## 2024-12-12 VITALS
TEMPERATURE: 98 F | HEART RATE: 72 BPM | OXYGEN SATURATION: 98 % | SYSTOLIC BLOOD PRESSURE: 105 MMHG | DIASTOLIC BLOOD PRESSURE: 72 MMHG | RESPIRATION RATE: 16 BRPM

## 2024-12-12 DIAGNOSIS — J45.909 ASTHMA, UNSPECIFIED ASTHMA SEVERITY, UNSPECIFIED WHETHER COMPLICATED, UNSPECIFIED WHETHER PERSISTENT: ICD-10-CM

## 2024-12-12 DIAGNOSIS — D50.8 IRON DEFICIENCY ANEMIA SECONDARY TO INADEQUATE DIETARY IRON INTAKE: Primary | ICD-10-CM

## 2024-12-12 PROCEDURE — 96375 TX/PRO/DX INJ NEW DRUG ADDON: CPT

## 2024-12-12 PROCEDURE — 96365 THER/PROPH/DIAG IV INF INIT: CPT

## 2024-12-12 PROCEDURE — 63600175 PHARM REV CODE 636 W HCPCS: Performed by: PHYSICIAN ASSISTANT

## 2024-12-12 PROCEDURE — 25000003 PHARM REV CODE 250: Performed by: PHYSICIAN ASSISTANT

## 2024-12-12 RX ORDER — SODIUM CHLORIDE 0.9 % (FLUSH) 0.9 %
10 SYRINGE (ML) INJECTION
Status: CANCELLED | OUTPATIENT
Start: 2024-12-12

## 2024-12-12 RX ORDER — METHYLPREDNISOLONE SOD SUCC 125 MG
80 VIAL (EA) INJECTION
Status: COMPLETED | OUTPATIENT
Start: 2024-12-12 | End: 2024-12-12

## 2024-12-12 RX ORDER — EPINEPHRINE 0.3 MG/.3ML
0.3 INJECTION SUBCUTANEOUS ONCE AS NEEDED
OUTPATIENT
Start: 2024-12-12

## 2024-12-12 RX ORDER — EPINEPHRINE 0.3 MG/.3ML
0.3 INJECTION SUBCUTANEOUS ONCE AS NEEDED
Status: DISCONTINUED | OUTPATIENT
Start: 2024-12-12 | End: 2024-12-12 | Stop reason: HOSPADM

## 2024-12-12 RX ORDER — DIPHENHYDRAMINE HYDROCHLORIDE 50 MG/ML
50 INJECTION INTRAMUSCULAR; INTRAVENOUS ONCE AS NEEDED
Status: DISCONTINUED | OUTPATIENT
Start: 2024-12-12 | End: 2024-12-12 | Stop reason: HOSPADM

## 2024-12-12 RX ORDER — SODIUM CHLORIDE 0.9 % (FLUSH) 0.9 %
10 SYRINGE (ML) INJECTION
Status: DISCONTINUED | OUTPATIENT
Start: 2024-12-12 | End: 2024-12-12 | Stop reason: HOSPADM

## 2024-12-12 RX ORDER — METHYLPREDNISOLONE SOD SUCC 125 MG
80 VIAL (EA) INJECTION
Status: CANCELLED
Start: 2024-12-12

## 2024-12-12 RX ORDER — DIPHENHYDRAMINE HYDROCHLORIDE 50 MG/ML
50 INJECTION INTRAMUSCULAR; INTRAVENOUS ONCE AS NEEDED
OUTPATIENT
Start: 2024-12-12

## 2024-12-12 RX ADMIN — METHYLPREDNISOLONE SODIUM SUCCINATE 80 MG: 125 INJECTION, POWDER, FOR SOLUTION INTRAMUSCULAR; INTRAVENOUS at 03:12

## 2024-12-12 RX ADMIN — FERUMOXYTOL 510 MG: 510 INJECTION INTRAVENOUS at 03:12

## 2024-12-12 NOTE — DISCHARGE INSTRUCTIONS
[facility]  [address] [address]  [address]  [phone] phone     [phone] fax  Hours of Operation: Monday- Friday 8:00am- 5:00pm  After hours phone  [phone]  Hematology / Oncology Physicians on call      LUZ Esquivel Dr., NP Phaon Dunbar, NP Khelsea Conley, FNP    Please call with any concerns regarding your appointment today.

## 2024-12-12 NOTE — PLAN OF CARE
Problem: Adult Inpatient Plan of Care  Goal: Plan of Care Review  Outcome: Progressing  Flowsheets (Taken 12/12/2024 1544)  Plan of Care Reviewed With: patient  Goal: Patient-Specific Goal (Individualized)  Outcome: Progressing  Flowsheets (Taken 12/12/2024 1544)  Individualized Care Needs: feet up, blanket and snacks  Anxieties, Fears or Concerns: none  Patient/Family-Specific Goals (Include Timeframe): tolerate treatmnet  Goal: Optimal Comfort and Wellbeing  Outcome: Progressing  Intervention: Provide Person-Centered Care  Flowsheets (Taken 12/12/2024 1544)  Trust Relationship/Rapport:   care explained   reassurance provided   choices provided   thoughts/feelings acknowledged   emotional support provided   empathic listening provided   questions answered   questions encouraged

## 2025-01-29 ENCOUNTER — CLINICAL SUPPORT (OUTPATIENT)
Dept: PULMONOLOGY | Facility: CLINIC | Age: 25
End: 2025-01-29
Payer: COMMERCIAL

## 2025-01-29 ENCOUNTER — OFFICE VISIT (OUTPATIENT)
Dept: PULMONOLOGY | Facility: CLINIC | Age: 25
End: 2025-01-29
Payer: COMMERCIAL

## 2025-01-29 VITALS
HEIGHT: 67 IN | HEART RATE: 90 BPM | OXYGEN SATURATION: 98 % | SYSTOLIC BLOOD PRESSURE: 112 MMHG | DIASTOLIC BLOOD PRESSURE: 80 MMHG | RESPIRATION RATE: 21 BRPM | BODY MASS INDEX: 27.06 KG/M2 | WEIGHT: 172.38 LBS

## 2025-01-29 VITALS — HEIGHT: 67 IN | BODY MASS INDEX: 27.06 KG/M2 | WEIGHT: 172.38 LBS

## 2025-01-29 DIAGNOSIS — J45.909 ASTHMA, UNSPECIFIED ASTHMA SEVERITY, UNSPECIFIED WHETHER COMPLICATED, UNSPECIFIED WHETHER PERSISTENT: ICD-10-CM

## 2025-01-29 PROCEDURE — 94060 EVALUATION OF WHEEZING: CPT | Mod: 59,S$GLB,, | Performed by: STUDENT IN AN ORGANIZED HEALTH CARE EDUCATION/TRAINING PROGRAM

## 2025-01-29 PROCEDURE — 3008F BODY MASS INDEX DOCD: CPT | Mod: CPTII,S$GLB,, | Performed by: STUDENT IN AN ORGANIZED HEALTH CARE EDUCATION/TRAINING PROGRAM

## 2025-01-29 PROCEDURE — 99999 PR PBB SHADOW E&M-EST. PATIENT-LVL II: CPT | Mod: PBBFAC,,,

## 2025-01-29 PROCEDURE — 3079F DIAST BP 80-89 MM HG: CPT | Mod: CPTII,S$GLB,, | Performed by: STUDENT IN AN ORGANIZED HEALTH CARE EDUCATION/TRAINING PROGRAM

## 2025-01-29 PROCEDURE — 99203 OFFICE O/P NEW LOW 30 MIN: CPT | Mod: 25,S$GLB,, | Performed by: STUDENT IN AN ORGANIZED HEALTH CARE EDUCATION/TRAINING PROGRAM

## 2025-01-29 PROCEDURE — 94618 PULMONARY STRESS TESTING: CPT | Mod: S$GLB,,, | Performed by: STUDENT IN AN ORGANIZED HEALTH CARE EDUCATION/TRAINING PROGRAM

## 2025-01-29 PROCEDURE — 99999 PR PBB SHADOW E&M-EST. PATIENT-LVL V: CPT | Mod: PBBFAC,,, | Performed by: STUDENT IN AN ORGANIZED HEALTH CARE EDUCATION/TRAINING PROGRAM

## 2025-01-29 PROCEDURE — 99999 PR PBB SHADOW E&M-EST. PATIENT-LVL I: CPT | Mod: PBBFAC,,,

## 2025-01-29 PROCEDURE — 3074F SYST BP LT 130 MM HG: CPT | Mod: CPTII,S$GLB,, | Performed by: STUDENT IN AN ORGANIZED HEALTH CARE EDUCATION/TRAINING PROGRAM

## 2025-01-29 RX ORDER — MOMETASONE FUROATE MONOHYDRATE 50 UG/1
2 SPRAY, METERED NASAL DAILY
Qty: 17 G | Refills: 3 | Status: SHIPPED | OUTPATIENT
Start: 2025-01-29 | End: 2025-02-28

## 2025-01-29 RX ORDER — BUDESONIDE AND FORMOTEROL FUMARATE DIHYDRATE 160; 4.5 UG/1; UG/1
2 AEROSOL RESPIRATORY (INHALATION) EVERY 12 HOURS
Qty: 1 G | Refills: 6 | Status: SHIPPED | OUTPATIENT
Start: 2025-01-29 | End: 2026-01-29

## 2025-01-29 NOTE — PROGRESS NOTES
Clinical Guide to Interpretation or FeNO Levels :    FeNO  (ppb) LOW INTERMEDIATE HIGH   ADULT VALUES < 25 25-50          > 50   Th2-driven Inflammation Unlikely Likely Significant     Patients FeNO level at this visit : 5 (ppb)    Interpretation of FeNO measurement in adults:  [] FENO is less than 25 ppb implies non eosinophilic airway inflammation or the absence of airway inflammation.    Comment: Low FENO (<25 ppb) in adult asthmatics with persistent symptoms suggests other etiologies for these symptoms and a lower likelihood of benefit from adding or increasing inhaled glucocorticoids.    [] FENO between 25 and 50 ppb in adults should be interpreted cautiously with reference to the clinical situation (eg, symptomatic, on or off therapy, current smoking).    [] FENO greater than 50 ppb in adults  suggests eosinophilic airway inflammation   Comment: High FENO (>50 ppb) in adult asthmatics even with atypical symptoms suggests glucocorticoid responsiveness. High FENO (>50 ppb) can help identify poor adherence or uncontrolled inflammation in asthma patients with otherwise seemingly controlled asthma.    Discussion:  A FENO less than 25 ppb in adults and less than 20 ppb in children younger than 12 years of age implies noneosinophilic airway inflammation or the absence of airway inflammation.  A FENO greater than 50 ppb in adults or greater than 35 ppb in children suggests eosinophilic airway inflammation.   Values of FENO between 25 and 50 ppb in adults (20 to 35 ppb in children) should be interpreted cautiously with reference to the clinical situation (eg, symptomatic, on or off therapy, current smoking).  A rising FENO with a greater than 20 percent change and more than 25 ppb (20 ppb in children) from a previously stable level suggests increasing eosinophilic airway inflammation, but there are wide inter-individual differences.  A decrease in FENO greater than 20 percent for values over 50 ppb or more than 10  ppb for values less than 50 ppb may be clinically important.  ?FENO in other respiratory diseases - Several other diseases are associated with altered levels of exhaled NO: low levels of FENO have been noted in cystic fibrosis, current smoking, pulmonary hypertension, hypothermia, primary ciliary dyskinesia, and bronchopulmonary dysplasia. Elevated FENO has been noted in atopy, nonasthmatic eosinophilic bronchitis, COPD exacerbations, noncystic fibrosis bronchiectasis, and viral upper respiratory infections.    REFERENCE:  ATS Board of Directors, December 2004, and by the ERS Executive Committee, June 2004. ATS/ERS Recommendations for Standardized Procedures for the Online and Offline Measurement of Exhaled Lower Respiratory Nitric Oxide and Nasal Nitric Oxide. Guideline 2005

## 2025-01-29 NOTE — PROCEDURES
Clinical Guide to Interpretation or FeNO Levels :    FeNO  (ppb) LOW INTERMEDIATE HIGH   ADULT VALUES < 25 25-50          > 50   Th2-driven Inflammation Unlikely Likely Significant     Patients FeNO level at this visit : 5 (ppb)    Interpretation of FeNO measurement in adults:  [x] FENO is less than 25 ppb implies non eosinophilic airway inflammation or the absence of airway inflammation.    Comment: Low FENO (<25 ppb) in adult asthmatics with persistent symptoms suggests other etiologies for these symptoms and a lower likelihood of benefit from adding or increasing inhaled glucocorticoids.      Discussion:  A FENO less than 25 ppb in adults and less than 20 ppb in children younger than 12 years of age implies noneosinophilic airway inflammation or the absence of airway inflammation.  A FENO greater than 50 ppb in adults or greater than 35 ppb in children suggests eosinophilic airway inflammation.   Values of FENO between 25 and 50 ppb in adults (20 to 35 ppb in children) should be interpreted cautiously with reference to the clinical situation (eg, symptomatic, on or off therapy, current smoking).  A rising FENO with a greater than 20 percent change and more than 25 ppb (20 ppb in children) from a previously stable level suggests increasing eosinophilic airway inflammation, but there are wide inter-individual differences.  A decrease in FENO greater than 20 percent for values over 50 ppb or more than 10 ppb for values less than 50 ppb may be clinically important.  ?FENO in other respiratory diseases - Several other diseases are associated with altered levels of exhaled NO: low levels of FENO have been noted in cystic fibrosis, current smoking, pulmonary hypertension, hypothermia, primary ciliary dyskinesia, and bronchopulmonary dysplasia. Elevated FENO has been noted in atopy, nonasthmatic eosinophilic bronchitis, COPD exacerbations, noncystic fibrosis bronchiectasis, and viral upper respiratory  infections.    REFERENCE:  ATS Board of Directors, December 2004, and by the ERS Executive Committee, June 2004. ATS/ERS Recommendations for Standardized Procedures for the Online and Offline Measurement of Exhaled Lower Respiratory Nitric Oxide and Nasal Nitric Oxide. Guideline 2005

## 2025-01-29 NOTE — PROCEDURES
"[facility] - Pulmonary Function  Six Minute Walk     SUMMARY     Ordering Provider: Dr. Walker   Interpreting Provider: Dr. Walker  Performing nurse/tech/RT: CHIQUIS Kerr CRT  Diagnosis: Asthma  Height: 5' 7" (170.2 cm)  Weight: 78.2 kg (172 lb 6.4 oz)  BMI (Calculated): 27                Phase Oxygen Assessment Supplemental O2 Heart   Rate Blood Pressure Roby Dyspnea Scale Rating   Resting 99 % Room Air 92 bpm 98/73 3   Exercise        Minute        1 98 % Room Air 121 bpm     2 98 % Room Air 136 bpm     3 100 % Room Air 138 bpm     4 99 % Room Air 131 bpm     5 97 % Room Air 135 bpm     6  97 % Room Air 149 bpm 133/74 5-6   Recovery        Minute        1 100 % Room Air 122 bpm     2 100 % Room Air 118 bpm     3 100 % Room Air 116 bpm     4 99 % Room Air 113 bpm 115/79 2     Six Minute Walk Summary  6MWT Status: completed without stopping  Patient Reported: Dyspnea (chest tightness)     Interpretation:  Did the patient stop or pause?: No                                         Total Time Walked (Calculated): 360 seconds  Final Partial Lap Distance (feet): 75 feet  Total Distance Meters (Calculated): 388.62 meters  Predicted Distance Meters (Calculated): 708.32 meters  Percentage of Predicted (Calculated): 54.87  Peak VO2 (Calculated): 15.64  Mets: 4.47  Has The Patient Had a Previous Six Minute Walk Test?: No       Previous 6MWT Results  Has The Patient Had a Previous Six Minute Walk Test?: No         CLINICAL INTERPRETATION:  Six minute walk distance is 388 m (54 % predicted) with heavy dyspnea.  During exercise, there was no significant desaturation while breathing room air.  This may represent normal cardiovascular response to exercise.  The patient did not report non-pulmonary symptoms during exercise.  Significant exercise impairment is likely due to subjective symptoms.  The patient did complete the study, walking 360 seconds of the 360 second test.  No previous study performed.  Based upon age and body " mass index, exercise capacity is less than predicted.      Summary:  This is an abnormal study, exercise capacity is limited for her age gender and height. No associate exercise induced hypoxemia.

## 2025-01-29 NOTE — PROGRESS NOTES
"  Chief complaint:  Chief Complaint   Patient presents with    Asthma        History of present illness -  TRINIDAD Anaya comes to clinic to establish care for her asthma.    She tells me she has had asthma since she was a child and never outgrew it, for the most part she use the rescue inhaler albuterol and this control her disease.  Most recently her asthma has been exacerbated due to cigarette smoke from her neighbors.  Thankfully they have not been at home for the past several days and she hopes that they never return    Symptoms: Cough, Shortness of breath , and Wheezing  Risk factors: Allergic rhinitis  Known triggers: Infection and Smoke  Apparent phenotype: T2-High  Tobacco use: Lifetime non-smoker  Occupational history: Recently graduated art and marketing.     Physical examination -   Physical Exam  Vitals and nursing note reviewed.   Constitutional:       Appearance: Normal appearance.   HENT:      Head: Normocephalic and atraumatic.      Nose: Nose normal.      Mouth/Throat:      Mouth: Mucous membranes are moist.   Eyes:      Pupils: Pupils are equal, round, and reactive to light.   Cardiovascular:      Rate and Rhythm: Normal rate and regular rhythm.      Pulses: Normal pulses.      Heart sounds: Normal heart sounds.   Pulmonary:      Effort: Pulmonary effort is normal.      Breath sounds: Normal breath sounds.   Abdominal:      General: Abdomen is flat.      Palpations: Abdomen is soft.   Musculoskeletal:         General: No swelling.   Skin:     General: Skin is warm.      Capillary Refill: Capillary refill takes less than 2 seconds.   Neurological:      General: No focal deficit present.      Mental Status: She is alert.        Vitals:    01/29/25 1521   BP: 112/80   BP Location: Right arm   Patient Position: Sitting   Pulse: 90   Resp: (!) 21   SpO2: 98%   Weight: 78.2 kg (172 lb 6.4 oz)   Height: 5' 7" (1.702 m)      Review of Systems   Constitutional: Negative.    HENT: Negative.     Eyes: Negative.  "   Respiratory: Negative.     Cardiovascular: Negative.    Gastrointestinal: Negative.    Musculoskeletal: Negative.    Skin: Negative.    Neurological: Negative.        Relevant data (Independently reviewed by me) -    Assessment & Plan    Asthma, unspecified asthma severity, unspecified whether complicated, unspecified whether persistent  -     Ambulatory referral/consult to Pulmonology  -     Stress test, pulmonary; Future; Expected date: 2025  -     Spirometry with/without bronchodilator; Future; Expected date: 2025  -     Fraction of  Nitric Oxide; Future; Expected date: 2025  -     IgE; Future; Expected date: 2025  -     Ambulatory referral/consult to Pulmonary Disease Management w/ Respiratory Therapist; Future; Expected date: 2025  -     CBC Auto Differential; Future; Expected date: 2025  -     X-Ray Chest PA And Lateral; Future; Expected date: 2025    Other orders  -     mometasone (NASONEX) 50 mcg/actuation nasal spray; 2 sprays by Nasal route once daily.  Dispense: 17 g; Refill: 3  -     budesonide-formoterol 160-4.5 mcg (SYMBICORT) 160-4.5 mcg/actuation HFAA; Inhale 2 puffs into the lungs every 12 (twelve) hours. Controller  Dispense: 1 g; Refill: 6    Moderate persistent asthma with recent exacerbation due to smoke exposure.  Patient never took maintenance inhalers, it is a good idea to start the patient on ics Laba to control her symptoms.  I suspect a component of allergic rhinitis therefore we will start fluticasone nasal spray.      Rest of the usual workup for asthma has been order fractional excretion of nitric oxide, x-ray, CBC, IgE.    RTC in 3 months to assess response.    Chris Walker   2025

## 2025-01-30 ENCOUNTER — HOSPITAL ENCOUNTER (OUTPATIENT)
Dept: RADIOLOGY | Facility: HOSPITAL | Age: 25
Discharge: HOME | End: 2025-01-30
Attending: STUDENT IN AN ORGANIZED HEALTH CARE EDUCATION/TRAINING PROGRAM
Payer: COMMERCIAL

## 2025-01-30 ENCOUNTER — OFFICE VISIT (OUTPATIENT)
Dept: GASTROENTEROLOGY | Facility: CLINIC | Age: 25
End: 2025-01-30
Payer: COMMERCIAL

## 2025-01-30 VITALS
WEIGHT: 171.94 LBS | DIASTOLIC BLOOD PRESSURE: 76 MMHG | HEART RATE: 97 BPM | BODY MASS INDEX: 26.99 KG/M2 | HEIGHT: 67 IN | SYSTOLIC BLOOD PRESSURE: 110 MMHG

## 2025-01-30 DIAGNOSIS — R63.4 WEIGHT LOSS, UNINTENTIONAL: ICD-10-CM

## 2025-01-30 DIAGNOSIS — D64.9 NORMOCYTIC ANEMIA: ICD-10-CM

## 2025-01-30 DIAGNOSIS — K59.00 CONSTIPATION, UNSPECIFIED CONSTIPATION TYPE: Primary | ICD-10-CM

## 2025-01-30 DIAGNOSIS — J45.909 ASTHMA, UNSPECIFIED ASTHMA SEVERITY, UNSPECIFIED WHETHER COMPLICATED, UNSPECIFIED WHETHER PERSISTENT: ICD-10-CM

## 2025-01-30 LAB
BRPFT: ABNORMAL
FEF 25 75 CHG: 30 %
FEF 25 75 LLN: 2.13
FEF 25 75 POST REF: 130 %
FEF 25 75 PRE REF: 100 %
FEF 25 75 REF: 3.57
FET100 CHG: 93.2 %
FEV1 CHG: -12.2 %
FEV1 FVC CHG: -5 %
FEV1 FVC LLN: 75
FEV1 FVC POST REF: 104.5 %
FEV1 FVC PRE REF: 110.1 %
FEV1 FVC REF: 87
FEV1 LLN: 2.44
FEV1 POST REF: 74.4 %
FEV1 PRE REF: 84.7 %
FEV1 REF: 3.12
FVC CHG: -7.5 %
FVC LLN: 2.83
FVC POST REF: 70.8 %
FVC PRE REF: 76.5 %
FVC REF: 3.62
PEF CHG: 12.9 %
PEF LLN: 5.22
PEF POST REF: 89.1 %
PEF PRE REF: 78.9 %
PEF REF: 7.42
POST FEF 25 75: 4.64 L/S (ref 2.13–5.26)
POST FET 100: 4.57 SEC
POST FEV1 FVC: 90.62 % (ref 75.43–95.42)
POST FEV1: 2.32 L (ref 2.44–3.78)
POST FVC: 2.56 L (ref 2.83–4.44)
POST PEF: 6.61 L/S (ref 5.22–9.62)
PRE FEF 25 75: 3.57 L/S (ref 2.13–5.26)
PRE FET 100: 2.37 SEC
PRE FEV1 FVC: 95.42 % (ref 75.43–95.42)
PRE FEV1: 2.65 L (ref 2.44–3.78)
PRE FVC: 2.77 L (ref 2.83–4.44)
PRE PEF: 5.86 L/S (ref 5.22–9.62)

## 2025-01-30 PROCEDURE — 3074F SYST BP LT 130 MM HG: CPT | Mod: CPTII,S$GLB,, | Performed by: INTERNAL MEDICINE

## 2025-01-30 PROCEDURE — 1159F MED LIST DOCD IN RCRD: CPT | Mod: CPTII,S$GLB,, | Performed by: INTERNAL MEDICINE

## 2025-01-30 PROCEDURE — 3078F DIAST BP <80 MM HG: CPT | Mod: CPTII,S$GLB,, | Performed by: INTERNAL MEDICINE

## 2025-01-30 PROCEDURE — 99204 OFFICE O/P NEW MOD 45 MIN: CPT | Mod: S$GLB,,, | Performed by: INTERNAL MEDICINE

## 2025-01-30 PROCEDURE — 99999 PR PBB SHADOW E&M-EST. PATIENT-LVL V: CPT | Mod: PBBFAC,,, | Performed by: INTERNAL MEDICINE

## 2025-01-30 PROCEDURE — 71046 X-RAY EXAM CHEST 2 VIEWS: CPT | Mod: TC

## 2025-01-30 PROCEDURE — 3008F BODY MASS INDEX DOCD: CPT | Mod: CPTII,S$GLB,, | Performed by: INTERNAL MEDICINE

## 2025-01-30 PROCEDURE — 71046 X-RAY EXAM CHEST 2 VIEWS: CPT | Mod: 26,,, | Performed by: RADIOLOGY

## 2025-01-30 RX ORDER — PREDNISONE 10 MG/1
10 TABLET ORAL
COMMUNITY
Start: 2024-12-16

## 2025-01-30 RX ORDER — SODIUM, POTASSIUM,MAG SULFATES 17.5-3.13G
1 SOLUTION, RECONSTITUTED, ORAL ORAL DAILY
Qty: 1 KIT | Refills: 0 | Status: SHIPPED | OUTPATIENT
Start: 2025-01-30 | End: 2025-02-01

## 2025-01-30 NOTE — PROGRESS NOTES
[facility]  Gastroenterology    Patient evaluated at the request of Anjali Owen PA-C  [id number] [place] [address]    PCP: Philippe Espinoza Jr., MD    1/30/25    HPI       Diarrhea     Additional comments: [place] diarrhea          Last edited by Windy Villegas MA on 1/30/2025  1:07 PM.      Reason for Visit: Constipation    Subjective:   Jaclyn Turpin is a 24 y.o. female here for evaluation of constipation. States she has had constipation for about 6 months. Tried Miralax for about 3 months but then it lost efficacy. Then went to prune juice but lately that hasn't been working as well. When symptoms get bad can get belching. Had a BM recently with stools that were lumpy in nature and possible pink tinge to the stool and lots of mucus. Patient also diagnosed with mild anemia- has seen heme-onc and it was felt to be nutritional- also needed Vit B12, Vit D. She was to receive IV feraheme and consider EGD if her anemia continued. Denies any FH of colon cancer. Also reports some unintentional weight loss recently. Was taken off of gluten many years ago.       Past Medical History:   Diagnosis Date    Alopecia     Asthma     Eating disorder     Managed currently    Psoriasis     Sickle cell trait        Past Surgical History:   Procedure Laterality Date    laparoscopic bilateral salpingectomy  12/28/2023    TUBAL LIGATION  December 2023    Bilateral salpingectomy    WISDOM TOOTH EXTRACTION         Current Outpatient Medications on File Prior to Visit   Medication Sig Dispense Refill    albuterol (PROVENTIL) 2.5 mg /3 mL (0.083 %) nebulizer solution       albuterol (PROVENTIL/VENTOLIN HFA) 90 mcg/actuation inhaler       budesonide-formoterol 160-4.5 mcg (SYMBICORT) 160-4.5 mcg/actuation HFAA Inhale 2 puffs into the lungs every 12 (twelve) hours. Controller 1 g 6    clindamycin (CLEOCIN T) 1 % Swab Apply topically once daily. 60 each 2    cranberry fruit (CRANBERRY) 450 mg Tab  Take by mouth.      desonide (DESOWEN) 0.05 % cream Apply topically once daily. 60 g 1    EPINEPHrine (EPIPEN) 0.3 mg/0.3 mL AtIn Inject 0.3 mg as directed daily as needed.      ergocalciferol (ERGOCALCIFEROL) 50,000 unit Cap Take 1 capsule (50,000 Units total) by mouth every 7 days. 12 capsule 1    fenugreek seed 610 mg Cap Take by mouth.      methocarbamoL (ROBAXIN) 750 MG Tab Take 750 mg by mouth as needed.      mometasone (NASONEX) 50 mcg/actuation nasal spray 2 sprays by Nasal route once daily. 17 g 3    predniSONE (DELTASONE) 10 MG tablet Take 10 mg by mouth.      tretinoin (RETIN-A) 0.025 % cream Apply topically every other day. 45 g 2     No current facility-administered medications on file prior to visit.       Review of patient's allergies indicates:   Allergen Reactions    Dairycare [lactobacillus acidoph-lactase]     Doxycycline     Gluten protein     [place] Hives and Itching     Itchy throat and hives    Minocycline     Strawberry Hives and Itching     Itching throat and mouth and hives       Social History     Socioeconomic History    Marital status: Single   Tobacco Use    Smoking status: Never    Smokeless tobacco: Never   Substance and Sexual Activity    Alcohol use: Yes    Drug use: Never    Sexual activity: Not Currently     Partners: Female, Male     Birth control/protection: Condom   Social History Narrative    ** Merged History Encounter **          Social Drivers of Health     Financial Resource Strain: Low Risk  (9/17/2024)    Overall Financial Resource Strain (CARDIA)     Difficulty of Paying Living Expenses: Not hard at all   Food Insecurity: No Food Insecurity (9/17/2024)    Hunger Vital Sign     Worried About Running Out of Food in the Last Year: Never true     Ran Out of Food in the Last Year: Never true   Transportation Needs: No Transportation Needs (2/1/2023)    Received from [facility]ili[facility], [facility] Subsidiaries and Affiliates    PRAPARE - Transportation     Lack of Transportation (Medical): No     Lack of Transportation (Non-Medical): No   Physical Activity: Inactive (9/17/2024)    Exercise Vital Sign     Days of Exercise per Week: 0 days     Minutes of Exercise per Session: 0 min   Stress: No Stress Concern Present (9/17/2024)    [employer] [place] [employer] - Occupational Stress Questionnaire     Feeling of Stress : Only a little   Housing Stability: Unknown (9/17/2024)    Housing Stability Vital Sign     Unable to Pay for Housing in the Last Year: No       Family History   Problem Relation Name Age of Onset    Breast cancer Mother Bernadette     Cancer Mother Bernadette     Diabetes Mother Bernadette     Hypertension Mother Bernadette     Stroke Mother Bernadette     Asthma Maternal Grandmother Daria     Ovarian cancer Paternal Grandmother         Review of Systems   Constitutional:  Positive for unexpected weight change. Negative for appetite change and fever.   HENT:  Negative for postnasal drip, rhinorrhea, sneezing, sore throat and trouble swallowing.    Eyes:  Negative for visual disturbance.   Respiratory:  Negative for cough, shortness of breath and wheezing.    Cardiovascular:  Negative for chest pain, palpitations and leg swelling.   Gastrointestinal:  Positive for abdominal distention (bloating), blood in stool (Questionable) and constipation. Negative for abdominal pain, diarrhea, nausea and vomiting.   Genitourinary:  Negative for dysuria.   Musculoskeletal:  Negative for arthralgias, joint swelling and myalgias.   Skin:  Negative for color change, pallor and rash.   Neurological:  Negative for weakness, light-headedness, numbness and headaches.   Hematological:  Negative for adenopathy. Does not bruise/bleed easily.   Psychiatric/Behavioral:  Negative for agitation.        Objective:   Vitals:   Vitals:    01/30/25 1301   BP: 110/76   Pulse: 97       Physical  Exam  Vitals reviewed.   Constitutional:       General: She is not in acute distress.     Appearance: She is not diaphoretic.   HENT:      Head: Normocephalic and atraumatic.      Mouth/Throat:      Pharynx: No oropharyngeal exudate.   Eyes:      General: No scleral icterus.        Right eye: No discharge.         Left eye: No discharge.      Conjunctiva/sclera: Conjunctivae normal.      Pupils: Pupils are equal, round, and reactive to light.   Cardiovascular:      Rate and Rhythm: Normal rate and regular rhythm.   Abdominal:      General: There is no distension.      Palpations: Abdomen is soft. There is no mass.      Tenderness: There is no abdominal tenderness. There is no guarding.   Musculoskeletal:         General: Normal range of motion.      Cervical back: Normal range of motion.   Skin:     General: Skin is warm and dry.      Coloration: Skin is not pale.      Findings: No erythema or rash.   Neurological:      Mental Status: She is alert and oriented to person, place, and time.         X-Ray Chest PA And Lateral    Result Date: 1/30/2025  EXAMINATION: XR CHEST PA AND LATERAL CLINICAL HISTORY: Unspecified asthma, uncomplicated TECHNIQUE: PA and lateral views of the chest were performed. COMPARISON: None FINDINGS: Cardiac silhouette and mediastinal contours are normal.  Lungs are clear.  Osseous structures are intact.     No acute cardiopulmonary process. Electronically signed by: Valerio Sherman MD Date:    01/30/2025 Time:    12:27      IMPRESSION     Problem List Items Addressed This Visit    None  Visit Diagnoses       Constipation, unspecified constipation type    -  Primary    Relevant Orders    Ambulatory referral/consult to Endo Procedure     Normocytic anemia        Relevant Orders    Ambulatory referral/consult to Endo Procedure     Weight loss, unintentional        Relevant Orders    Ambulatory referral/consult to Endo Procedure             PLANS:    - Patient has  tried increasing fiber (prunes) and Miralax but these are no longer working for her constipation  - Trial of Linzess 145 mcg po daily. Possible side effects of diarrhea and/or abdominal cramping discussed  - Due to possible blood in stool (pink tinge in stool) and HARITHA, will go ahead and proceed with both EGD and colonoscopy with extended prep now. Prep e-scribed to patient's pharmacy  - Further recommendations pending the above    Constipation, unspecified constipation type  -     Ambulatory referral/consult to Gastroenterology  -     Ambulatory referral/consult to Endo Procedure ; Future; Expected date: 01/31/2025    Normocytic anemia  -     Ambulatory referral/consult to Endo Procedure ; Future; Expected date: 01/31/2025    Weight loss, unintentional  -     Ambulatory referral/consult to Endo Procedure ; Future; Expected date: 01/31/2025    Other orders  -     linaCLOtide (LINZESS) 145 mcg Cap capsule; Take 1 capsule (145 mcg total) by mouth before breakfast.  Dispense: 30 capsule; Refill: 3  -     sodium,potassium,mag sulfates (SUPREP BOWEL PREP KIT) 17.5-3.13-1.6 gram SolR; Take 177 mLs by mouth once daily. for 2 days  Dispense: 1 kit; Refill: 0      Natasha Moreno MD  Gastroenterology

## 2025-01-31 ENCOUNTER — TELEPHONE (OUTPATIENT)
Dept: PULMONOLOGY | Facility: CLINIC | Age: 25
End: 2025-01-31
Payer: COMMERCIAL

## 2025-01-31 ENCOUNTER — HOSPITAL ENCOUNTER (OUTPATIENT)
Dept: PREADMISSION TESTING | Facility: HOSPITAL | Age: 25
Discharge: HOME | End: 2025-01-31
Attending: INTERNAL MEDICINE
Payer: COMMERCIAL

## 2025-01-31 DIAGNOSIS — D64.9 NORMOCYTIC ANEMIA: ICD-10-CM

## 2025-01-31 DIAGNOSIS — R63.4 WEIGHT LOSS, UNINTENTIONAL: ICD-10-CM

## 2025-01-31 DIAGNOSIS — K59.00 CONSTIPATION, UNSPECIFIED CONSTIPATION TYPE: Primary | ICD-10-CM

## 2025-01-31 NOTE — TELEPHONE ENCOUNTER
Chronic Disease Management  Called patient to schedule initial Pulmonary Disease Management appointment.   No answer. Left message.

## 2025-02-03 ENCOUNTER — TELEPHONE (OUTPATIENT)
Dept: PULMONOLOGY | Facility: CLINIC | Age: 25
End: 2025-02-03
Payer: COMMERCIAL

## 2025-02-03 NOTE — TELEPHONE ENCOUNTER
----- Message from Anjali sent at 2/3/2025  2:40 PM CST -----  Contact: pt  Type:  Sooner Apoointment Request    Caller is requesting a sooner appointment.  Caller declined first available appointment listed below.  Caller will not accept being placed on the waitlist and is requesting a message be sent to doctor.  Name of Caller: pt  When is the first available appointment? Pt missed appt for today and need to julia  Symptoms:  initial - asthma  Would the patient rather a call back or a response via [facility]? phone  Best Call Back Number:  [phone]  Additional Information:

## 2025-02-06 ENCOUNTER — CLINICAL SUPPORT (OUTPATIENT)
Dept: PULMONOLOGY | Facility: CLINIC | Age: 25
End: 2025-02-06
Payer: COMMERCIAL

## 2025-02-06 ENCOUNTER — LAB VISIT (OUTPATIENT)
Dept: LAB | Facility: HOSPITAL | Age: 25
End: 2025-02-06
Attending: PHYSICIAN ASSISTANT
Payer: COMMERCIAL

## 2025-02-06 DIAGNOSIS — D50.8 IRON DEFICIENCY ANEMIA SECONDARY TO INADEQUATE DIETARY IRON INTAKE: ICD-10-CM

## 2025-02-06 DIAGNOSIS — J45.909 ASTHMA: Primary | ICD-10-CM

## 2025-02-06 DIAGNOSIS — E53.8 FOLATE DEFICIENCY: ICD-10-CM

## 2025-02-06 DIAGNOSIS — E53.8 VITAMIN B12 DEFICIENCY: ICD-10-CM

## 2025-02-06 DIAGNOSIS — J45.909 ASTHMA, UNSPECIFIED ASTHMA SEVERITY, UNSPECIFIED WHETHER COMPLICATED, UNSPECIFIED WHETHER PERSISTENT: ICD-10-CM

## 2025-02-06 DIAGNOSIS — Z00.00 ANNUAL PHYSICAL EXAM: ICD-10-CM

## 2025-02-06 LAB
ALBUMIN SERPL BCP-MCNC: 4.4 G/DL (ref 3.5–5.2)
ALP SERPL-CCNC: 83 U/L (ref 40–150)
ALT SERPL W/O P-5'-P-CCNC: 9 U/L (ref 10–44)
ANION GAP SERPL CALC-SCNC: 9 MMOL/L (ref 8–16)
AST SERPL-CCNC: 19 U/L (ref 10–40)
BASOPHILS # BLD AUTO: 0.04 K/UL (ref 0–0.2)
BASOPHILS NFR BLD: 0.9 % (ref 0–1.9)
BILIRUB SERPL-MCNC: 0.7 MG/DL (ref 0.1–1)
BUN SERPL-MCNC: 4 MG/DL (ref 6–20)
CALCIUM SERPL-MCNC: 9.5 MG/DL (ref 8.7–10.5)
CHLORIDE SERPL-SCNC: 108 MMOL/L (ref 95–110)
CHOLEST SERPL-MCNC: 160 MG/DL (ref 120–199)
CHOLEST/HDLC SERPL: 3.1 {RATIO} (ref 2–5)
CO2 SERPL-SCNC: 22 MMOL/L (ref 23–29)
CREAT SERPL-MCNC: 0.6 MG/DL (ref 0.5–1.4)
DIFFERENTIAL METHOD BLD: ABNORMAL
EOSINOPHIL # BLD AUTO: 0 K/UL (ref 0–0.5)
EOSINOPHIL NFR BLD: 0.5 % (ref 0–8)
ERYTHROCYTE [DISTWIDTH] IN BLOOD BY AUTOMATED COUNT: 13.1 % (ref 11.5–14.5)
EST. GFR  (NO RACE VARIABLE): >60 ML/MIN/1.73 M^2
ESTIMATED AVG GLUCOSE: 74 MG/DL (ref 68–131)
FERRITIN SERPL-MCNC: 243 NG/ML (ref 20–300)
FOLATE SERPL-MCNC: 4.5 NG/ML (ref 4–24)
GLUCOSE SERPL-MCNC: 73 MG/DL (ref 70–110)
HBA1C MFR BLD: 4.2 % (ref 4–5.6)
HCT VFR BLD AUTO: 37.1 % (ref 37–48.5)
HDLC SERPL-MCNC: 52 MG/DL (ref 40–75)
HDLC SERPL: 32.5 % (ref 20–50)
HGB BLD-MCNC: 13 G/DL (ref 12–16)
IMM GRANULOCYTES # BLD AUTO: 0.01 K/UL (ref 0–0.04)
IMM GRANULOCYTES NFR BLD AUTO: 0.2 % (ref 0–0.5)
IRON SERPL-MCNC: 99 UG/DL (ref 30–160)
LDLC SERPL CALC-MCNC: 95 MG/DL (ref 63–159)
LYMPHOCYTES # BLD AUTO: 1.1 K/UL (ref 1–4.8)
LYMPHOCYTES NFR BLD: 26 % (ref 18–48)
MCH RBC QN AUTO: 31.2 PG (ref 27–31)
MCHC RBC AUTO-ENTMCNC: 35 G/DL (ref 32–36)
MCV RBC AUTO: 89 FL (ref 82–98)
MONOCYTES # BLD AUTO: 0.4 K/UL (ref 0.3–1)
MONOCYTES NFR BLD: 9.9 % (ref 4–15)
NEUTROPHILS # BLD AUTO: 2.7 K/UL (ref 1.8–7.7)
NEUTROPHILS NFR BLD: 62.5 % (ref 38–73)
NONHDLC SERPL-MCNC: 108 MG/DL
NRBC BLD-RTO: 0 /100 WBC
PLATELET # BLD AUTO: 423 K/UL (ref 150–450)
PMV BLD AUTO: 9.4 FL (ref 9.2–12.9)
POTASSIUM SERPL-SCNC: 4 MMOL/L (ref 3.5–5.1)
PROT SERPL-MCNC: 7.6 G/DL (ref 6–8.4)
RBC # BLD AUTO: 4.17 M/UL (ref 4–5.4)
SATURATED IRON: 34 % (ref 20–50)
SODIUM SERPL-SCNC: 139 MMOL/L (ref 136–145)
TOTAL IRON BINDING CAPACITY: 295 UG/DL (ref 250–450)
TRANSFERRIN SERPL-MCNC: 199 MG/DL (ref 200–375)
TRIGL SERPL-MCNC: 65 MG/DL (ref 30–150)
TSH SERPL DL<=0.005 MIU/L-ACNC: 1.52 UIU/ML (ref 0.4–4)
VIT B12 SERPL-MCNC: 466 PG/ML (ref 210–950)
WBC # BLD AUTO: 4.35 K/UL (ref 3.9–12.7)

## 2025-02-06 PROCEDURE — 82728 ASSAY OF FERRITIN: CPT | Performed by: PHYSICIAN ASSISTANT

## 2025-02-06 PROCEDURE — 84443 ASSAY THYROID STIM HORMONE: CPT | Performed by: PHYSICIAN ASSISTANT

## 2025-02-06 PROCEDURE — 82607 VITAMIN B-12: CPT | Performed by: PHYSICIAN ASSISTANT

## 2025-02-06 PROCEDURE — 99999 PR PBB SHADOW E&M-EST. PATIENT-LVL II: CPT | Mod: PBBFAC,,,

## 2025-02-06 PROCEDURE — 80061 LIPID PANEL: CPT | Performed by: PHYSICIAN ASSISTANT

## 2025-02-06 PROCEDURE — 83540 ASSAY OF IRON: CPT | Performed by: PHYSICIAN ASSISTANT

## 2025-02-06 PROCEDURE — 83036 HEMOGLOBIN GLYCOSYLATED A1C: CPT | Performed by: PHYSICIAN ASSISTANT

## 2025-02-06 PROCEDURE — 82746 ASSAY OF FOLIC ACID SERUM: CPT | Performed by: PHYSICIAN ASSISTANT

## 2025-02-06 PROCEDURE — 80053 COMPREHEN METABOLIC PANEL: CPT | Performed by: PHYSICIAN ASSISTANT

## 2025-02-06 PROCEDURE — 85025 COMPLETE CBC W/AUTO DIFF WBC: CPT | Performed by: PHYSICIAN ASSISTANT

## 2025-02-06 NOTE — PATIENT INSTRUCTIONS
ACTION PLAN    GREEN ZONE  My sputum is clear/white/usual color and easily cleared.  My breathing is no harder than usual.  I can do my usual activities.  I can think clearly.   Take your usual medicines, including oxygen, as you are told to do so by your health care provider.   YELLOW ZONE  My sputum has change (color, thickness, amount).  I am more short of breath than usual.  I cough or wheeze more.  I weigh more and my legs/feet swell.  I cannot do my usual activities without resting.   Call your health care provider. You will probably be told to begin taking an antibiotic and prednisone. Have your pharmacy phone number available.   RED ZONE  I cannot cough out my sputum.  I am much more short of breath than normal.  I need to sit up to breathe  I cannot do my usual activities.  I am unable to speak more than one or two words at a time.  I am confused.   Call your health care provider. You may be asked to come in to be seen, told to go to the emergency room, or told to call 9-1-1.     Asthma Trigger Checklist  Allergens, irritants, and other things may trigger your asthma. Check the box next to each of your triggers. After each trigger is a list of ways to avoid it.   Dust mites. Dust mites live in mattresses, bedding, carpets, curtains, and indoor dust.  To kill dust mites, wash bedding in hot water (130°F) each week.  Cover mattress and pillows with special dust-mite-proof cases.  Don't use upholstered furniture like sofas or chairs in the bedroom.  Use allergy-proof filters for air conditioners and furnaces. Replace or clean them as instructed.  If you can, replace carpeting with wood or tile edy, especially in the bedroom.  Animals. Animals with fur or feathers shed dander (allergens).  It's best to choose a pet that doesn't have fur or feathers, such as a fish or a reptile.  If you have pets, keep them off your bed and out of your bedroom.  Wash your hands and clothes after handling pets.    Mold. Mold  grows in damp places, such as bathrooms, basements, and closets.  Ask someone to clean damp areas in your home every week. Or try wearing a face mask while you clean.  Run an exhaust fan while bathing. Or leave a window open in the bathroom.  Repair water leaks in or around your home.  Have someone else cut grass or rake leaves, if possible.  Don't use vaporizers or humidifiers. They encourage mold growth.    Pollen. Pollen from trees, grasses, and weeds is a common allergen. (Flower pollens are generally not a problem).  Try to learn what types of pollen affect you most. Pollen levels vary depending on the plant, the season, and the time of day.  If possible, use air conditioning instead of opening the windows in your home or car.  Have someone else do yard work, if possible.    Cockroaches. Roaches are found in many homes and produce allergens.  Keep your kitchen clean and dry. A leaky faucet or drain can attract roaches.  Remove garbage from your home daily.  Store food in tightly sealed containers. Wash dishes as soon as they are used.  Use bait stations or traps to control roaches. Avoid using chemical sprays.    Smoke. Smoke may be from cigarettes, cigars, pipes, incense or candles, barbecues or grills, and fireplaces.  Don't smoke. And don't let people smoke in your home or car.  When you travel, ask for nonsmoking rental cars and hotel rooms.  Avoid fireplaces and wood stoves. If you can't, sit away from them. Make sure the smoke is directed outside.  Don't burn incense or use candles.  Move away from smoky outdoor cooking grills.    Smog.  Smog is from car exhaust and other pollution.  Read or listen to local air-quality reports. These let you know when air quality is poor.  Stay indoors as much as you can on smoggy days. If possible, use air conditioning instead of opening the windows.  In your car, set air conditioning to recirculate air, so less pollution gets in.    Strong odors. These include air  fresheners, deodorizers, and cleaning products; perfume, deodorant, and other beauty products; incense and candles; and insect sprays and other sprays.  Use scent-free products like deodorant or body lotion.  Avoid using cleaning products with bleach and ammonia. Make your own cleaning solution with white vinegar, baking soda, or mild dish soap.  Use exhaust fans while cooking. Or open a window, if possible.   Avoid perfumes, air fresheners, potpourri, and other scented products.          Other irritants. These include dust, aerosol sprays, and powders.  Wear a face mask while doing tasks like sanding, dusting, sweeping, and yard work. Open doors and windows if working indoors.  Use pump spray bottles instead of aerosols.  Pour liquid  onto a rag or cloth instead of spraying them.    Weather. Weather conditions can trigger symptoms or make them worse.  Watch for very high or low temperatures, very humid conditions, or a lot of wind, as these conditions can make symptoms worse.  Limit outdoor activity during the type of weather that affects you.  Wear a scarf over your mouth and nose in cold weather.    Colds, flu, and sinus infections. Upper respiratory infections can trigger asthma.  Wash your hands often with soap and warm water or use a hand  containing alcohol.  Get a yearly flu shot. And ask if you should get a pneumonia vaccine.  Take care of your general health. Get plenty of sleep. And eat a variety of healthy foods.    Food additives. Food additives can trigger asthma flare-ups in some people.  Check food labels for sulfites or other similar ingredients. These are often found in foods such as wine, beer, and dried fruits.  Avoid foods that contain these additives.    Medicine. Aspirin, NSAIDS like ibuprofen and naproxen, and heart medicines like beta-blockers may be triggers.  Tell your health care provider if you think certain medicines trigger symptoms.   Be sure to read the labels on  over-the-counter medicines. They may have ingredients that cause symptoms for you.   , Emotions. Laughing, crying, or feeling excited are triggers for some people.   To help you stay calm: Try breathing in slowly through your nose for a count of 2 seconds. Close your lips and breathe out for 4 seconds. Repeat.  Try to focus on a soothing image in your mind. This will help relax you and calm your breathing.  Remember to take your daily controller medicines. When you're upset or under stress, it's easy to forget.    Exercise. For some people, exercise can trigger symptoms. Don't let this keep you from being active.   If you have not been exercising regularly, start slow and work up gradually.  Take all of your medicines as prescribed.  If you use quick-relief medicine, make sure you have it with you when you exercise.  Stop if you have any symptoms. Make sure you talk with your provider about these symptoms.  © 9651-1800 [employer]. [address] [place] [address], [place] [address]. All rights reserved. This information is not intended as a substitute for professional medical care. Always follow your healthcare professional's instructions.

## 2025-02-06 NOTE — PROGRESS NOTES
Pulmonary Disease Management  [facility]  Chronic Care Management  Initial Visit       Diagnosis: ASTHMA  Last Hospital Admission: NA  Last provider visit: 1/29/25      Current Outpatient Medications:     albuterol (PROVENTIL) 2.5 mg /3 mL (0.083 %) nebulizer solution, , Disp: , Rfl:     albuterol (PROVENTIL/VENTOLIN HFA) 90 mcg/actuation inhaler, , Disp: , Rfl:     budesonide-formoterol 160-4.5 mcg (SYMBICORT) 160-4.5 mcg/actuation HFAA, Inhale 2 puffs into the lungs every 12 (twelve) hours. Controller, Disp: 1 g, Rfl: 6    clindamycin (CLEOCIN T) 1 % Swab, Apply topically once daily., Disp: 60 each, Rfl: 2    cranberry fruit (CRANBERRY) 450 mg Tab, Take by mouth., Disp: , Rfl:     desonide (DESOWEN) 0.05 % cream, Apply topically once daily., Disp: 60 g, Rfl: 1    EPINEPHrine (EPIPEN) 0.3 mg/0.3 mL AtIn, Inject 0.3 mg as directed daily as needed., Disp: , Rfl:     ergocalciferol (ERGOCALCIFEROL) 50,000 unit Cap, Take 1 capsule (50,000 Units total) by mouth every 7 days., Disp: 12 capsule, Rfl: 1    fenugreek seed 610 mg Cap, Take by mouth., Disp: , Rfl:     linaCLOtide (LINZESS) 145 mcg Cap capsule, Take 1 capsule (145 mcg total) by mouth before breakfast., Disp: 30 capsule, Rfl: 3    methocarbamoL (ROBAXIN) 750 MG Tab, Take 750 mg by mouth as needed., Disp: , Rfl:     mometasone (NASONEX) 50 mcg/actuation nasal spray, 2 sprays by Nasal route once daily., Disp: 17 g, Rfl: 3    predniSONE (DELTASONE) 10 MG tablet, Take 10 mg by mouth., Disp: , Rfl:     tretinoin (RETIN-A) 0.025 % cream, Apply topically every other day., Disp: 45 g, Rfl: 2    Review of patient's allergies indicates:   Allergen Reactions    Dairycare [lactobacillus acidoph-lactase]     Doxycycline     Gluten protein     [place] Hives and Itching     Itchy throat and hives    Minocycline     Strawberry Hives and Itching     Itching throat and mouth and hives       Smoking history:   Social History     Tobacco Use   Smoking Status Never    Smokeless Tobacco Never                                                                      ACT Questionnaire: 19       PFT Results:  Pre FVC   Date/Time Value Ref Range Status   01/30/2025 07:40 AM 2.77 (L) 2.83 - 4.44 L Final     Pre FEV1   Date/Time Value Ref Range Status   01/30/2025 07:40 AM 2.65 2.44 - 3.78 L Final     Pre FEV1 FVC   Date/Time Value Ref Range Status   01/30/2025 07:40 AM 95.42 (H) 75.43 - 95.42 % Final            Patient Concerns or Expectations:   NA  Therapist Comments:   Jaclyn Turpin  was seen 2/6/2025  3:27 PM in the Pulmonary Disease management clinic for evaluation, education, reinforcement of self management techniques and exacerbation action plan.    Danay Anne    Past Medical History:   Diagnosis Date    Alopecia     Asthma     Eating disorder     Managed currently    Psoriasis     Sickle cell trait                Educational assessment:   [x]            Good  []            Fair  []            Poor    Readiness to learn:   [x]            Good  []            Fair  []            Poor    Vision Status:   [x]            Good  []            Fair  []            Poor    Reading Ability:  [x]            Good  []            Fair  []            Poor    Knowledge of condition:   [x]            Good  []            Fair  []            Poor    Language Barriers:   []            Good  []            Fair  []            Poor  [x]            None    Cognitive/ Physical Barriers:   []            Good  []            Fair  []            Poor  [x]            None    Learning best by:                       [x]            Seeing  [x]            Hearing  [x]            Reading                         [x]            Doing    Describe any barrier /Limitation or financial implications of care choices identified     []            Financial  []            Emotional  []            Education  []            Vision/Hearing  []            Physical  [x]            None  []                TOPIC /CONTENT FOR  TODAY:    [x]            MDI with or without spacer  [x]            Dry powder inhaler  []            Acapella   []           Peak Flow meter  [x]            COPD action plan  []            Nebulizer use  []            Oxygen use safety  []            Breathing and cough techniques  []            Energy conservation  [x]            Infection prevention  [x]            Asthma trigger checklist        Learner:    [x]            Patient   []            Caregiver    Method:    [x]            Verbal explanation  [x]            Audio visual    [x]            Literature  [x]            Teach back      Evaluation:    [x]            Teach back  []            Demonstrate  []            Follow up phone call    []            2 weeks     []            4 weeks   []            PRN    Additional comments:   Patient was seen today to review respiratory medication purpose and proper technique for use of inhalers. Patient practiced proper technique using MDI with valved holding chamber (spacer) and DPI inhalers. Patient demonstrated understanding. Literature was given to patient.     Asthma trigger checklist was verbally reviewed and literature given to patient.     Infection prevention was discussed. Patient was reminded to get influenza vaccine. Hand hygiene and cleaning of respiratory equipment was also discussed. Patient verbalized understanding.      COPD action plan was reviewed and literature was given to patient. Patient verbalized understanding.           Plan:  Monthly Pulmonary Disease Management Questionnaire  Follow-up PDM appointment scheduled for 8/6/25   Reinforce education  Meds: Symbicort, albuterol  DME Needs: na  Action Plan: asthma  Immunization: Pneumococcal- due, Flu-due  Next Provider Visit: 4/28/25  Next Spirometry/CPFT: not scheduled  Approximate time spent with patient: 30 minutes

## 2025-02-11 ENCOUNTER — OFFICE VISIT (OUTPATIENT)
Dept: DERMATOLOGY | Facility: CLINIC | Age: 25
End: 2025-02-11
Payer: COMMERCIAL

## 2025-02-11 DIAGNOSIS — L70.0 ACNE VULGARIS: Primary | ICD-10-CM

## 2025-02-11 DIAGNOSIS — L21.9 SEBORRHEIC DERMATITIS: ICD-10-CM

## 2025-02-11 PROCEDURE — 3044F HG A1C LEVEL LT 7.0%: CPT | Mod: CPTII,S$GLB,, | Performed by: STUDENT IN AN ORGANIZED HEALTH CARE EDUCATION/TRAINING PROGRAM

## 2025-02-11 PROCEDURE — 99213 OFFICE O/P EST LOW 20 MIN: CPT | Mod: S$GLB,,, | Performed by: STUDENT IN AN ORGANIZED HEALTH CARE EDUCATION/TRAINING PROGRAM

## 2025-02-11 PROCEDURE — 1159F MED LIST DOCD IN RCRD: CPT | Mod: CPTII,S$GLB,, | Performed by: STUDENT IN AN ORGANIZED HEALTH CARE EDUCATION/TRAINING PROGRAM

## 2025-02-11 PROCEDURE — G2211 COMPLEX E/M VISIT ADD ON: HCPCS | Mod: S$GLB,,, | Performed by: STUDENT IN AN ORGANIZED HEALTH CARE EDUCATION/TRAINING PROGRAM

## 2025-02-11 PROCEDURE — 99999 PR PBB SHADOW E&M-EST. PATIENT-LVL III: CPT | Mod: PBBFAC,,, | Performed by: STUDENT IN AN ORGANIZED HEALTH CARE EDUCATION/TRAINING PROGRAM

## 2025-02-11 PROCEDURE — 1160F RVW MEDS BY RX/DR IN RCRD: CPT | Mod: CPTII,S$GLB,, | Performed by: STUDENT IN AN ORGANIZED HEALTH CARE EDUCATION/TRAINING PROGRAM

## 2025-02-11 RX ORDER — TRETINOIN 0.5 MG/G
CREAM TOPICAL NIGHTLY
Qty: 45 G | Refills: 3 | Status: SHIPPED | OUTPATIENT
Start: 2025-02-11

## 2025-02-11 RX ORDER — CLINDAMYCIN PHOSPHATE 10 MG/ML
SOLUTION TOPICAL DAILY
Qty: 60 EACH | Refills: 2 | Status: SHIPPED | OUTPATIENT
Start: 2025-02-11 | End: 2026-02-11

## 2025-02-11 NOTE — PROGRESS NOTES
Subjective:       Patient ID:  Jaclyn Turpin is a 24 y.o. female who presents for   Chief Complaint   Patient presents with    Follow-up     History of Present Illness: The patient presents for follow up of acne and seb derm on the face, last seen on 11/8/24, started on desonide and fluconazole for the seb derm, and clindamycin and tretinoin for the acne. Reports that symptoms are much improved compared to previous. Flaking, redness and itching on the face have resolved. Her acne breakouts are less and skin texture feels more even than previous. Tolerating the medications well. No other concerning rash or skin lesions.            Review of Systems   Constitutional:  Negative for fever and chills.   Skin:  Negative for itching, rash and dry skin.        Objective:    Physical Exam   Constitutional: She appears well-developed and well-nourished. No distress.   Neurological: She is alert and oriented to person, place, and time. She is not disoriented.   Psychiatric: She has a normal mood and affect.   Skin:   Areas Examined (abnormalities noted in diagram):   Head / Face Inspection Performed  Neck Inspection Performed              Diagram Legend     Erythematous scaling macule/papule c/w actinic keratosis       Vascular papule c/w angioma      Pigmented verrucoid papule/plaque c/w seborrheic keratosis      Yellow umbilicated papule c/w sebaceous hyperplasia      Irregularly shaped tan macule c/w lentigo     1-2 mm smooth white papules consistent with Milia      Movable subcutaneous cyst with punctum c/w epidermal inclusion cyst      Subcutaneous movable cyst c/w pilar cyst      Firm pink to brown papule c/w dermatofibroma      Pedunculated fleshy papule(s) c/w skin tag(s)      Evenly pigmented macule c/w junctional nevus     Mildly variegated pigmented, slightly irregular-bordered macule c/w mildly atypical nevus      Flesh colored to evenly pigmented papule c/w intradermal nevus       Pink pearly papule/plaque c/w basal  cell carcinoma      Erythematous hyperkeratotic cursted plaque c/w SCC      Surgical scar with no sign of skin cancer recurrence      Open and closed comedones      Inflammatory papules and pustules      Verrucoid papule consistent consistent with wart     Erythematous eczematous patches and plaques     Dystrophic onycholytic nail with subungual debris c/w onychomycosis     Umbilicated papule    Erythematous-base heme-crusted tan verrucoid plaque consistent with inflamed seborrheic keratosis     Erythematous Silvery Scaling Plaque c/w Psoriasis     See annotation      Assessment / Plan:        Acne vulgaris  Seborrheic dermatitis - symptoms much improved. Recommend to use desonide as needed. Continue topical clindamycin and will increase strength of tretinoin to 0.05% cream (monitor for increase in side effects).   -     tretinoin (RETIN-A) 0.05 % cream; Apply topically every evening.  Dispense: 45 g; Refill: 3  -     clindamycin (CLEOCIN T) 1 % Swab; Apply topically once daily.  Dispense: 60 each; Refill: 2  -     Counseled patient on gentle skin care regimen, including need for sensitive soaps/detergents, as well as need for frequent use of sensitize moisturizers.                    Follow up in about 6 months (around 8/11/2025).

## 2025-02-12 NOTE — PATIENT INSTRUCTIONS

## 2025-02-14 ENCOUNTER — LAB VISIT (OUTPATIENT)
Dept: LAB | Facility: HOSPITAL | Age: 25
End: 2025-02-14
Attending: INTERNAL MEDICINE
Payer: COMMERCIAL

## 2025-02-14 ENCOUNTER — OFFICE VISIT (OUTPATIENT)
Dept: INTERNAL MEDICINE | Facility: CLINIC | Age: 25
End: 2025-02-14
Payer: COMMERCIAL

## 2025-02-14 VITALS
DIASTOLIC BLOOD PRESSURE: 72 MMHG | OXYGEN SATURATION: 96 % | HEIGHT: 67 IN | SYSTOLIC BLOOD PRESSURE: 114 MMHG | HEART RATE: 88 BPM | BODY MASS INDEX: 26.61 KG/M2 | RESPIRATION RATE: 20 BRPM | TEMPERATURE: 97 F | WEIGHT: 169.56 LBS

## 2025-02-14 DIAGNOSIS — K59.09 CHRONIC CONSTIPATION: ICD-10-CM

## 2025-02-14 DIAGNOSIS — T14.8XXA BRUISING: ICD-10-CM

## 2025-02-14 DIAGNOSIS — R63.4 WEIGHT LOSS: Primary | ICD-10-CM

## 2025-02-14 DIAGNOSIS — J45.20 MILD INTERMITTENT ASTHMA WITHOUT COMPLICATION: ICD-10-CM

## 2025-02-14 LAB
APTT PPP: 27.5 SEC (ref 21–32)
INR PPP: 1 (ref 0.8–1.2)
PROTHROMBIN TIME: 11.2 SEC (ref 9–12.5)

## 2025-02-14 PROCEDURE — 85610 PROTHROMBIN TIME: CPT | Performed by: INTERNAL MEDICINE

## 2025-02-14 PROCEDURE — 36415 COLL VENOUS BLD VENIPUNCTURE: CPT | Performed by: INTERNAL MEDICINE

## 2025-02-14 PROCEDURE — 85730 THROMBOPLASTIN TIME PARTIAL: CPT | Performed by: INTERNAL MEDICINE

## 2025-02-14 NOTE — PROGRESS NOTES
"Jaclyn Turpin  24 y.o. Black or  female  [id number]    Chief Complaint:  Chief Complaint   Patient presents with    Establish Care    Weight Loss    Bleeding/Bruising     On leg        History of Present Illness:  History of Present Illness    CHIEF COMPLAINT:  Ms. Turpin presents today for follow up of multiple concerns including right-sided coldness.    CURRENT SYMPTOMS:  She reports experiencing right-sided coldness for the past 4 days, describing the right side of her body as feeling "ice cold" compared to the left side. She has had two instances of unexplained, painless, blue-colored bruising over the past month, including one on her arm. She denies any history of easy bruising or trauma to the affected areas.    GASTROINTESTINAL:  She experiences alternating constipation and diarrhea approximately twice per month. She is scheduled for a colonoscopy and has received the preparatory medication.    HEMATOLOGIC:  She has a history of low hemoglobin, which dropped to 5 at age 17 and was treated with iron infusions. She recently underwent iron infusions a couple months ago, which were effective in normalizing her hemoglobin levels.    RESPIRATORY:  She has lifelong asthma with her first attack occurring at age 23, triggered by a neighbor's smoke. Her asthma has worsened over the past year, leading to a change in her inhaler regimen.    MEDICATIONS:  She uses Symbicort twice daily and denies recent albuterol use since starting Symbicort.    SOCIAL HISTORY:  She consumes alcohol once per week, with a maximum of 5 drinks on that occasion.         Review of Systems   Constitutional:  Positive for malaise/fatigue and weight loss.   HENT:  Negative for hearing loss.    Eyes:  Negative for blurred vision.   Respiratory:  Negative for cough and shortness of breath.    Cardiovascular:  Positive for chest pain. Negative for palpitations and leg swelling.   Gastrointestinal:  Positive for abdominal pain (mild " occasionally) and constipation. Negative for blood in stool and diarrhea.   Genitourinary:  Negative for dysuria.   Musculoskeletal:  Negative for back pain and joint pain.   Neurological:  Negative for dizziness and headaches.   Psychiatric/Behavioral:  Negative for depression. The patient has insomnia. The patient is not nervous/anxious.        Laboratory  Lab Results   Component Value Date    WBC 4.35 02/06/2025    HGB 13.0 02/06/2025    HCT 37.1 02/06/2025     02/06/2025    CHOL 160 02/06/2025    TRIG 65 02/06/2025    HDL 52 02/06/2025    ALT 9 (L) 02/06/2025    AST 19 02/06/2025     02/06/2025    K 4.0 02/06/2025     02/06/2025    CREATININE 0.6 02/06/2025    BUN 4 (L) 02/06/2025    CO2 22 (L) 02/06/2025    TSH 1.525 02/06/2025    HGBA1C 4.2 02/06/2025     Lab Results   Component Value Date    LDLCALC 95.0 02/06/2025       The following were reviewed: Active problem list, medication list, allergies, family history, social history, and Health Maintenance.     History:  Past Medical History:   Diagnosis Date    Alopecia     Asthma     Eating disorder     Managed currently    Psoriasis     Sickle cell trait      Past Surgical History:   Procedure Laterality Date    laparoscopic bilateral salpingectomy  12/28/2023    TUBAL LIGATION  December 2023    Bilateral salpingectomy    WISDOM TOOTH EXTRACTION       Family History   Problem Relation Name Age of Onset    Breast cancer Mother Bernadette     Cancer Mother Bernadette     Diabetes Mother Bernadette     Hypertension Mother Bernadette     Stroke Mother Bernadette     Asthma Maternal Grandmother Daria     Ovarian cancer Paternal Grandmother       Social History     Socioeconomic History    Marital status: Single   Tobacco Use    Smoking status: Never    Smokeless tobacco: Never   Substance and Sexual Activity    Alcohol use: Yes    Drug use: Never    Sexual activity: Not Currently     Partners: Female, Male     Birth control/protection: Condom   Social History Narrative     ** Merged History Encounter **          Social Drivers of Health     Financial Resource Strain: Low Risk  (9/17/2024)    Overall Financial Resource Strain (CARDIA)     Difficulty of Paying Living Expenses: Not hard at all   Food Insecurity: No Food Insecurity (9/17/2024)    Hunger Vital Sign     Worried About Running Out of Food in the Last Year: Never true     Ran Out of Food in the Last Year: Never true   Transportation Needs: No Transportation Needs (2/1/2023)    Received from [facility]    PRAPARE - Transportation     Lack of Transportation (Medical): No     Lack of Transportation (Non-Medical): No   Physical Activity: Inactive (9/17/2024)    Exercise Vital Sign     Days of Exercise per Week: 0 days     Minutes of Exercise per Session: 0 min   Stress: No Stress Concern Present (9/17/2024)    [employer] - Occupational Stress Questionnaire     Feeling of Stress : Only a little   Housing Stability: Unknown (9/17/2024)    Housing Stability Vital Sign     Unable to Pay for Housing in the Last Year: No     Patient Active Problem List   Diagnosis    Caries    At high risk for breast cancer    Family history of breast cancer    Vitamin D deficiency    Sickle cell trait    Iron deficiency anemia secondary to inadequate dietary iron intake    Vitamin B12 deficiency    Asthma     Review of patient's allergies indicates:   Allergen Reactions    Dairycare [lactobacillus acidoph-lactase]     Doxycycline     Gluten protein     [place] Hives and Itching     Itchy throat and hives    Minocycline     Strawberry Hives and Itching     Itching throat and mouth and hives       Health Maintenance  Health Maintenance Topics with due status: Not Due       Topic Last Completion Date    TETANUS VACCINE 01/31/2024    Chlamydia Screening 02/07/2025     Pap Smear 02/07/2025    RSV Vaccine (Age 60+ and Pregnant patients) Not Due     Health Maintenance Due   Topic Date Due    Pneumococcal Vaccines (Age 0-49) (1 of 2 - PCV) Never done    COVID-19 Vaccine (3 - 2024-25 season) 09/01/2024       Medications:  Current Outpatient Medications on File Prior to Visit   Medication Sig Dispense Refill    albuterol (PROVENTIL) 2.5 mg /3 mL (0.083 %) nebulizer solution       albuterol (PROVENTIL/VENTOLIN HFA) 90 mcg/actuation inhaler       budesonide-formoterol 160-4.5 mcg (SYMBICORT) 160-4.5 mcg/actuation HFAA Inhale 2 puffs into the lungs every 12 (twelve) hours. Controller 1 g 6    clindamycin (CLEOCIN T) 1 % Swab Apply topically once daily. 60 each 2    clindamycin (CLEOCIN T) 1 % Swab Apply topically once daily. 60 each 2    cranberry fruit (CRANBERRY) 450 mg Tab Take by mouth.      desonide (DESOWEN) 0.05 % cream Apply topically once daily. 60 g 1    EPINEPHrine (EPIPEN) 0.3 mg/0.3 mL AtIn Inject 0.3 mg as directed daily as needed.      ergocalciferol (ERGOCALCIFEROL) 50,000 unit Cap Take 1 capsule (50,000 Units total) by mouth every 7 days. 12 capsule 1    fenugreek seed 610 mg Cap Take by mouth.      methocarbamoL (ROBAXIN) 750 MG Tab Take 750 mg by mouth as needed.      mometasone (NASONEX) 50 mcg/actuation nasal spray 2 sprays by Nasal route once daily. 17 g 3    tretinoin (RETIN-A) 0.05 % cream Apply topically every evening. 45 g 3    linaCLOtide (LINZESS) 145 mcg Cap capsule Take 1 capsule (145 mcg total) by mouth before breakfast. (Patient not taking: Reported on 2/14/2025) 30 capsule 3    [DISCONTINUED] predniSONE (DELTASONE) 10 MG tablet Take 10 mg by mouth.      [DISCONTINUED] tretinoin (RETIN-A) 0.025 % cream Apply topically every other day. (Patient not taking: Reported on 2/14/2025) 45 g 2     No current facility-administered medications on file prior to visit.       Medications have been reviewed and reconciled with patient at visit today.    Exam:  Vitals:     02/14/25 1146   BP: 114/72   Pulse: 88   Resp: 20   Temp: 96.5 °F (35.8 °C)     Weight: 76.9 kg (169 lb 8.5 oz)   Body mass index is 26.55 kg/m².      Physical Exam    Vitals: Reviewed.  Constitutional: No acute distress. Well-developed. Not ill-appearing.  Eyes: No scleral icterus.  Cardiovascular: Normal rate and regular rhythm. Normal heart sounds.  Pulmonary: Pulmonary effort is normal. No respiratory distress. Normal breath sounds.  Abdomen: Soft. Nontender. Nondistended. Normoactive bowel sounds.  Extremities: No edema.  Skin: Warm. Dry.  Neurological: Alert and oriented to person, place, and time.  Psychiatric: Behavior normal.         Assessment:  The primary encounter diagnosis was Weight loss. Diagnoses of Chronic constipation, Mild intermittent asthma without complication, and Bruising were also pertinent to this visit.    Assessment & Plan    WEIGHT LOSS/ANEMIA (HISTORY):  - Reviewed recent labs, confirming normal iron levels.  - Noted the patient's history of severe anemia at age 17 with hemoglobin dropping to 5, which was treated with iron infusions.  - Confirmed that iron infusions administered a few months ago were effective in normalizing lab values.  - Recent labs show the patient is not currently anemic, with all lab values, including hemoglobin, within normal range.    CONSTIPATION:  - Evaluated reported constipation and previous GI issues.  - Resumed Linzess as previously prescribed by Dr. Calderon for constipation management.  - Ms. Turpin reports ongoing constipation and occasional diarrhea, occurring about twice a month.  - Noted that the patient is under the care of a gastroenterologist and had a colonoscopy in March.  - Advised the patient to use the prescribed Linzess daily for constipation management.  - Provided the patient with colonoscopy preparation medication.    ASTHMA:  - Assessed the patient's asthma management, noting improvement with Symbicort.  - Continued Symbicort inhaler twice  daily for asthma management.  - Ms. Turpin has had asthma for their entire life, with the first asthma attack at age 23 triggered by neighbor's smoke.  - Noted that the condition has been worsening in the past year.  - Performed a physical exam and auscultated the patient's breathing.  - Ms. Turpin has not needed to use the rescue inhaler (albuterol) since starting Symbicort.  - Recommend pneumonia vaccine due to the patient's asthma, but the patient decided to defer.    UNEXPLAINED BRUISING:  - Ordered clotting factor tests to evaluate reported random bruising.  - Ms. Turpin reports two instances of unexplained bruising: one current bruise and another on the arm about a month ago.  - Lab tests show normal platelet count.  - Ordered additional labs to check clotting factors to investigate the cause of bruising.    PATIENT REFUSAL OF VACCINATION:  - Discussed various immunization options with the patient, including flu vaccine, COVID-19 booster, and pneumonia vaccine.  - Noted that the patient has never received a flu vaccine before.  - Ms. Turpin declined flu vaccine and pneumonia vaccine, and is undecided about future COVID-19 boosters.    LABS:  - Reviewed recent labs, confirming normal thyroid, electrolytes, blood sugar, and CBC.  - Ms. Turpin to complete ordered labs today.    OTHER INSTRUCTIONS:  - Considered potential causes for reported cold sensation on right side of body.    FOLLOW UP:  - Follow up after reviewing labs results.

## 2025-02-17 ENCOUNTER — RESULTS FOLLOW-UP (OUTPATIENT)
Dept: INTERNAL MEDICINE | Facility: CLINIC | Age: 25
End: 2025-02-17

## 2025-02-19 ENCOUNTER — PATIENT MESSAGE (OUTPATIENT)
Dept: ADMINISTRATIVE | Facility: OTHER | Age: 25
End: 2025-02-19
Payer: COMMERCIAL

## 2025-02-19 ENCOUNTER — PATIENT MESSAGE (OUTPATIENT)
Dept: PULMONOLOGY | Facility: CLINIC | Age: 25
End: 2025-02-19
Payer: COMMERCIAL

## 2025-04-29 ENCOUNTER — OFFICE VISIT (OUTPATIENT)
Dept: PULMONOLOGY | Facility: CLINIC | Age: 25
End: 2025-04-29
Payer: COMMERCIAL

## 2025-04-29 VITALS
HEART RATE: 103 BPM | WEIGHT: 165.81 LBS | OXYGEN SATURATION: 98 % | RESPIRATION RATE: 21 BRPM | BODY MASS INDEX: 26.02 KG/M2 | SYSTOLIC BLOOD PRESSURE: 118 MMHG | HEIGHT: 67 IN | DIASTOLIC BLOOD PRESSURE: 64 MMHG

## 2025-04-29 DIAGNOSIS — J45.40 MODERATE PERSISTENT ASTHMA WITHOUT COMPLICATION: Primary | ICD-10-CM

## 2025-04-29 PROCEDURE — 3078F DIAST BP <80 MM HG: CPT | Mod: CPTII,S$GLB,, | Performed by: STUDENT IN AN ORGANIZED HEALTH CARE EDUCATION/TRAINING PROGRAM

## 2025-04-29 PROCEDURE — 3044F HG A1C LEVEL LT 7.0%: CPT | Mod: CPTII,S$GLB,, | Performed by: STUDENT IN AN ORGANIZED HEALTH CARE EDUCATION/TRAINING PROGRAM

## 2025-04-29 PROCEDURE — 3074F SYST BP LT 130 MM HG: CPT | Mod: CPTII,S$GLB,, | Performed by: STUDENT IN AN ORGANIZED HEALTH CARE EDUCATION/TRAINING PROGRAM

## 2025-04-29 PROCEDURE — 99212 OFFICE O/P EST SF 10 MIN: CPT | Mod: S$GLB,,, | Performed by: STUDENT IN AN ORGANIZED HEALTH CARE EDUCATION/TRAINING PROGRAM

## 2025-04-29 PROCEDURE — 99999 PR PBB SHADOW E&M-EST. PATIENT-LVL IV: CPT | Mod: PBBFAC,,, | Performed by: STUDENT IN AN ORGANIZED HEALTH CARE EDUCATION/TRAINING PROGRAM

## 2025-04-29 PROCEDURE — 1159F MED LIST DOCD IN RCRD: CPT | Mod: CPTII,S$GLB,, | Performed by: STUDENT IN AN ORGANIZED HEALTH CARE EDUCATION/TRAINING PROGRAM

## 2025-04-29 PROCEDURE — 3008F BODY MASS INDEX DOCD: CPT | Mod: CPTII,S$GLB,, | Performed by: STUDENT IN AN ORGANIZED HEALTH CARE EDUCATION/TRAINING PROGRAM

## 2025-04-29 NOTE — PROGRESS NOTES
Chief complaint:  Chief Complaint   Patient presents with    Asthma        History of present illness -  Established clinic patient for T2 low asthma.  Had asthma in her childhood eventually outgrew it but then came back with a vengeance in her early 20s.  Was only treated with rescue inhaler for a number of years.    Symptoms: Cough, Shortness of breath , and Wheezing  Risk factors: Allergic rhinitis  Known triggers: Infection and Smoke  Apparent phenotype: T2-High  Tobacco use: Lifetime non-smoker  Occupational history: Recently graduated [employer].     Asthma  Her past medical history is significant for asthma.      Interval history   04/30/2025   Patient comes back for follow-up with dramatic improvement of her symptoms, she is still compliant with Symbicort 2 puffs twice a day.  No respiratory symptoms no lingering cough no wheezing.  Excited because was finally able to get a job.  About to celebrate her birthday next week.    Physical examination -   Physical Exam  Vitals and nursing note reviewed.   Constitutional:       Appearance: Normal appearance.   HENT:      Head: Normocephalic and atraumatic.      Nose: Nose normal.      Mouth/Throat:      Mouth: Mucous membranes are moist.   Eyes:      Pupils: Pupils are equal, round, and reactive to light.   Cardiovascular:      Rate and Rhythm: Normal rate and regular rhythm.      Pulses: Normal pulses.      Heart sounds: Normal heart sounds.   Pulmonary:      Effort: Pulmonary effort is normal.      Breath sounds: Normal breath sounds.   Abdominal:      General: Abdomen is flat.      Palpations: Abdomen is soft.   Musculoskeletal:         General: No swelling.   Skin:     General: Skin is warm.      Capillary Refill: Capillary refill takes less than 2 seconds.   Neurological:      General: No focal deficit present.      Mental Status: She is alert.        Vitals:    04/29/25 1517   BP: 118/64   BP Location: Right arm   Patient Position: Sitting   Pulse:  "103   Resp: (!) 21   SpO2: 98%   Weight: 75.2 kg (165 lb 12.6 oz)   Height: 5' 7" (1.702 m)      Review of Systems   Constitutional: Negative.    HENT: Negative.     Eyes: Negative.    Respiratory: Negative.     Cardiovascular: Negative.    Gastrointestinal: Negative.    Musculoskeletal: Negative.    Skin: Negative.    Neurological: Negative.        Relevant data (Independently reviewed by me) -    Labs   Irrelevant, total eosinophil count was low IgE was low.    Spirometry   01/29/2025 6 minute walk test was abnormal due to decreased exercise tolerance, fractional excretion of nitric oxide was 5 parts per billion, spirometry suggests restriction no body plethysmography.    Assessment & Plan    Moderate persistent asthma without complication      Moderate persistent asthma who was suboptimally controlled during last visit, to initiation of maintenance therapy with ics Laba dramatic resolution of the symptoms.    Investigations were not successful in order to prove T2 high asthma, perhaps she does have nonallergic asthma.  Regardless she is having good response to therapy we will keep her on this medication for a number of months and then decide whether to stop it or continue it.    RTC in 8 months    Chris Walker   04/29/2025      "

## 2025-05-06 DIAGNOSIS — J45.909 ASTHMA, UNSPECIFIED ASTHMA SEVERITY, UNSPECIFIED WHETHER COMPLICATED, UNSPECIFIED WHETHER PERSISTENT: Primary | ICD-10-CM

## 2025-05-06 RX ORDER — MOMETASONE FUROATE MONOHYDRATE 50 UG/1
2 SPRAY, METERED NASAL DAILY
Qty: 17 G | Refills: 3 | Status: SHIPPED | OUTPATIENT
Start: 2025-05-06 | End: 2025-06-05

## 2025-07-15 RX ORDER — LINACLOTIDE 145 UG/1
145 CAPSULE, GELATIN COATED ORAL
Qty: 30 CAPSULE | Refills: 6 | Status: SHIPPED | OUTPATIENT
Start: 2025-07-15

## 2025-07-31 DIAGNOSIS — L70.0 ACNE VULGARIS: ICD-10-CM

## 2025-08-05 ENCOUNTER — TELEPHONE (OUTPATIENT)
Dept: PULMONOLOGY | Facility: CLINIC | Age: 25
End: 2025-08-05
Payer: COMMERCIAL

## 2025-08-05 RX ORDER — CLINDAMYCIN PHOSPHATE 10 MG/ML
SOLUTION TOPICAL
Qty: 60 EACH | Refills: 0 | Status: SHIPPED | OUTPATIENT
Start: 2025-08-05

## 2025-08-05 NOTE — TELEPHONE ENCOUNTER
Chronic Disease Management:   Called patient to confirm Pulmonary Disease Management appointment.

## 2025-08-07 ENCOUNTER — CLINICAL SUPPORT (OUTPATIENT)
Dept: PULMONOLOGY | Facility: CLINIC | Age: 25
End: 2025-08-07
Payer: COMMERCIAL

## 2025-08-07 DIAGNOSIS — J45.40 MODERATE PERSISTENT ASTHMA WITHOUT COMPLICATION: Primary | ICD-10-CM

## 2025-08-07 NOTE — PROGRESS NOTES
Pulmonary Disease Management  [facility]  Follow up Virtual Visit  Chronic Care Management    Jaclyn Turpin  was seen 8/7/2025  1:00 PM in the Pulmonary Disease management clinic for evaluation, education, reinforcement of self management techniques and exacerbation action plan.    The patient location is:  [address] [place] [address] [id number]  [facility] [id number]  Visit type: Virtual visit with synchronous audio and video     Danay Anne    Past Medical History:   Diagnosis Date    Alopecia     Asthma     Eating disorder     Managed currently    Psoriasis     Sickle cell trait        Patient's Medications   New Prescriptions    No medications on file   Previous Medications    ALBUTEROL (PROVENTIL) 2.5 MG /3 ML (0.083 %) NEBULIZER SOLUTION        ALBUTEROL (PROVENTIL/VENTOLIN HFA) 90 MCG/ACTUATION INHALER        BUDESONIDE-FORMOTEROL 160-4.5 MCG (SYMBICORT) 160-4.5 MCG/ACTUATION HFAA    Inhale 2 puffs into the lungs every 12 (twelve) hours. Controller    CLINDAMYCIN (CLEOCIN T) 1 % SWAB    APPLY TO AFFECTED AREA(S) OF SKIN ONCE A DAY    CRANBERRY FRUIT (CRANBERRY) 450 MG TAB    Take by mouth.    DESONIDE (DESOWEN) 0.05 % CREAM    Apply topically once daily.    EPINEPHRINE (EPIPEN) 0.3 MG/0.3 ML ATIN    Inject 0.3 mg as directed daily as needed.    ERGOCALCIFEROL (ERGOCALCIFEROL) 50,000 UNIT CAP    Take 1 capsule (50,000 Units total) by mouth every 7 days.    FENUGREEK SEED 610 MG CAP    Take by mouth.    LINACLOTIDE (LINZESS) 145 MCG CAP CAPSULE    TAKE 1 CAPSULE BY MOUTH BEFORE BREAKFAST    METHOCARBAMOL (ROBAXIN) 750 MG TAB    Take 750 mg by mouth as needed.    TRETINOIN (RETIN-A) 0.05 % CREAM    Apply topically every evening.   Modified Medications    No medications on file   Discontinued Medications    No medications on file       Office Spirometry Results:         6/23/2025    10:02 AM 4/29/2025     3:17 PM 4/8/2025     3:15 PM 2/14/2025    11:46 AM 2/7/2025    11:18 AM 1/30/2025     1:01 PM 1/29/2025      "4:31 PM   Pulmonary Function Tests   SpO2  98 %  96 %      Ordering Provider       Dr. Walker   Performing nurse/tech/RT       CHIQUIS Kerr CRT   Diagnosis       Asthma   Height 5' 7" (1.702 m) 5' 7" (1.702 m) 5' 7" (1.702 m) 5' 7" (1.702 m) 5' 7" (1.702 m) 5' 7" (1.702 m) 5' 7" (1.702 m)   Weight 73.5 kg (162 lb) 75.2 kg (165 lb 12.6 oz) 74.2 kg (163 lb 9.6 oz) 76.9 kg (169 lb 8.5 oz) 77.6 kg (171 lb) 78 kg (171 lb 15.3 oz) 78.2 kg (172 lb 6.4 oz)   BMI (Calculated) 25.4 26 25.6 26.5 26.8 26.9 27   6MWT Status       completed without stopping   Patient Reported       Dyspnea   Was O2 used?       No   6MW Distance walked (feet)       1275 feet   Distance walked (meters)       388.62 meters   Did patient stop?       No   Type of assistive device(s) used?       no assistive devices   Oxygen Saturation       99 %   Supplemental Oxygen       Room Air   Heart Rate       92 bpm   Blood Pressure       98/73   Roby Dyspnea Rating        moderate   Oxygen Saturation       97 %   Supplemental Oxygen       Room Air   Heart Rate       149 bpm   Blood Pressure       133/74   Roby Dyspnea Rating        heavy   Recovery Time (seconds)       240 seconds   Oxygen Saturation       99 %   Supplemental Oxygen       Room Air   Heart Rate       113 bpm   Blood Pressure       115/79   Roby Dyspnea Rating        light   Is procedure ready for interpretation?       Yes   Oxygen Qualification?       No         6/23/2025    10:02 AM   Pulmonary Studies Review   Height 5' 7" (1.702 m)   Weight 73.5 kg (162 lb)   BMI (Calculated) 25.4   Predicted Distance 573.75   Predicted Distance Meters (Calculated) 713.35 meters                 Educational assessment:   [x]            Good  []            Fair  []            Poor    Readiness to learn:   [x]            Good  []            Fair  []            Poor    Vision Status:   [x]            Good  []            Fair  []            Poor    Reading Ability:  [x]            Good  []            Fair  []     "        Poor    Knowledge of condition:   [x]            Good  []            Fair  []            Poor    Language Barriers:   []            Good  []            Fair  []            Poor  [x]            None    Cognitive/ Physical Barriers:   []            Good  []            Fair  []            Poor  []            None    Learning best by:                       [x]            Seeing  [x]            Hearing  [x]            Reading                         [x]            Doing    Describe any barrier /Limitation or financial implications of care choices identified     []            Financial  []            Emotional  []            Education  []            Vision/Hearing  []            Physical  [x]            None  []                TOPIC /CONTENT FOR TODAY:    [x]            MDI with or without spacer  []            Dry powder inhaler  []            Acapella   []           Peak Flow meter  [x]            Asthma action plan  []            Nebulizer use  []            Oxygen use safety  []            Breathing and cough techniques  []            Energy conservation  []            Infection prevention  []           OTHER________________________        Learner:    [x]            Patient   []            Caregiver    Method:    [x]            Verbal explanation  []            Audio visual    [x]            Literature  [x]            Teach back      Evaluation:    [x]            Teach back  []            Demonstrate  [x]            Follow up phone call    []            2 weeks     [x]            4 weeks   []            PRN      Patient concerns and expectations:  Has productive cough in the mornings white thin mucus.   Uses Breyna as prescribed    Therapist comments:   Patient was seen today to review respiratory medication purpose and proper technique for use of inhalers. Patient practiced proper technique using MDI with valved holding chamber (spacer) and DPI inhalers. Patient demonstrated understanding. Literature was given  to patient.     Asthma trigger checklist was verbally reviewed and literature given to patient.     Infection prevention was discussed. Patient was reminded to get influenza vaccine. Hand hygiene and cleaning of respiratory equipment was also discussed. Patient verbalized understanding.      Asthma action plan was reviewed and literature was given to patient. Patient verbalized understanding.         Plan:  Monthly Pulmonary Disease Management Questionnaire  Follow-up PDM appointment scheduled for 1/21/26    Reinforce education  Meds: ICS/LABA, KIA  DME Needs: NA  Action Plan: ASTHMA  Immunization: Pneumococcal- DECLINED, Flu-DECLINED  Next Provider Visit: 12/29/  Next Spirometry/CPFT: NOT SCHEDULED  Approximate time spent with patient: 15  MINUTES

## 2025-08-11 ENCOUNTER — TELEPHONE (OUTPATIENT)
Dept: PULMONOLOGY | Facility: CLINIC | Age: 25
End: 2025-08-11
Payer: COMMERCIAL

## 2025-09-03 DIAGNOSIS — L70.0 ACNE VULGARIS: ICD-10-CM

## 2025-09-03 RX ORDER — CLINDAMYCIN PHOSPHATE 10 MG/ML
SOLUTION TOPICAL
Qty: 60 EACH | Refills: 0 | Status: SHIPPED | OUTPATIENT
Start: 2025-09-03